# Patient Record
Sex: MALE | Race: BLACK OR AFRICAN AMERICAN
[De-identification: names, ages, dates, MRNs, and addresses within clinical notes are randomized per-mention and may not be internally consistent; named-entity substitution may affect disease eponyms.]

---

## 2017-04-10 ENCOUNTER — HOSPITAL ENCOUNTER (EMERGENCY)
Dept: HOSPITAL 62 - ER | Age: 77
Discharge: HOME | End: 2017-04-10
Payer: OTHER GOVERNMENT

## 2017-04-10 VITALS — SYSTOLIC BLOOD PRESSURE: 149 MMHG | DIASTOLIC BLOOD PRESSURE: 60 MMHG

## 2017-04-10 DIAGNOSIS — M10.9: Primary | ICD-10-CM

## 2017-04-10 DIAGNOSIS — M25.562: ICD-10-CM

## 2017-04-10 LAB
ANION GAP SERPL CALC-SCNC: 18 MMOL/L (ref 5–19)
BUN SERPL-MCNC: 81 MG/DL (ref 7–20)
CALCIUM: 10.2 MG/DL (ref 8.4–10.2)
CHLORIDE SERPL-SCNC: 95 MMOL/L (ref 98–107)
CO2 SERPL-SCNC: 28 MMOL/L (ref 22–30)
CREAT SERPL-MCNC: 2.95 MG/DL (ref 0.52–1.25)
GLUCOSE SERPL-MCNC: 109 MG/DL (ref 75–110)
MAGNESIUM SERPL-MCNC: 2.2 MG/DL (ref 1.6–2.3)
POTASSIUM SERPL-SCNC: 4.2 MMOL/L (ref 3.6–5)
SODIUM SERPL-SCNC: 140.9 MMOL/L (ref 137–145)

## 2017-04-10 PROCEDURE — 99283 EMERGENCY DEPT VISIT LOW MDM: CPT

## 2017-04-10 PROCEDURE — 36415 COLL VENOUS BLD VENIPUNCTURE: CPT

## 2017-04-10 PROCEDURE — 83735 ASSAY OF MAGNESIUM: CPT

## 2017-04-10 PROCEDURE — 80048 BASIC METABOLIC PNL TOTAL CA: CPT

## 2017-04-10 NOTE — ER DOCUMENT REPORT
ED Extremity Problem, Lower





- General


Chief Complaint: L knee pain


Stated Complaint: KNEE PAIN


Time seen by provider: 18:08


Mode of Arrival: Wheelchair


Information source: Patient


Notes: 


76-year-old male presents to ED for complain of left knee pain.  [facility] and [facility] that he had [facility]out he has a history of gout.  He states he does 

not know of any injury to this knee and these been on prednisone 10 mg for 3 

days.  He cannot take nonsteroidal anti-inflammatories due to a kidney failure.


TRAVEL OUTSIDE OF THE [place] IN LAST 30 DAYS: No





- HPI


Patient complains to provider of: Pain, Swelling, Other - Spasms to arms and 

legs he said for about a week or sleeping when he tries to sleep


Location: Knee


Occurred: Last week


Onset/Duration: Intermittent


Quality of pain: Sharp, Throbbing


Severity: Severe


Pain Level: 5


Context: Other - Gout to the knee


Recent injury: No


Associated symptoms: Painful ambulation


Exacerbated by: Movement, Walking


Relieved by: Nothing





- Related Data


Allergies/Adverse Reactions: 


 





shellfish derived Allergy (Verified 04/10/17 17:31)


 











Past Medical History





- General


Information source: Patient





- Social History


Smoking Status: Former Smoker


Cigarette use (# per day): No


Chew tobacco use (# tins/day): No


Smoking Education Provided: No


Frequency of alcohol use: None


Drug Abuse: None


Occupation: disabled


Lives with: Family - daughter


Family History: Reviewed & Not Pertinent


Patient has suicidal ideation: No


Patient has homicidal ideation: No





- Past Medical History


Cardiac Medical History: Reports: Hx DVT, Hx Hypertension, Hx Pulmonary Embolism


Pulmonary Medical History: Reports: None


EENT Medical History: Reports: None


Neurological Medical History: Reports: None


Endocrine Medical History: Reports: None


Renal/ Medical History: Reports: Hx Benign Prostatic Hyperplasia, Hx End 

Stage Renal Disease - myglobinuria, rhabdomyolysis, Other - Has been on 

hemodialysis in the past but is not on hemodialysis at this time.  His kidneys 

are starting to fail again.


Malignancy Medical History: Reports None


GI Medical History: Reports: Hx Diverticulitis, Hx Gastritis, Hx Colonoscopy, 

Hx Endoscopy


Musculoskeltal Medical History: Reports Hx Arthritis, Reports Hx Gout, Reports 

Hx Musculoskeletal Deformity, Reports Hx Musculoskeletal Trauma


Skin Medical History: Reports None


Psychiatric Medical History: Reports: None


Traumatic Medical History: Reports: None


Infectious Medical History: Reports: None


Past Surgical History: Reports: Hx Bowel Surgery, Hx Cholecystectomy, Hx 

Colostomy - And revision, Hx Pacemaker, Hx Tonsillectomy, Other - Dialysis 

shunt and revision





- Immunizations


Immunizations up to date: Yes





Review of Systems





- Review of Systems


Constitutional: No symptoms reported


EENT: No symptoms reported


Cardiovascular: No symptoms reported


Respiratory: No symptoms reported


Gastrointestinal: No symptoms reported


Genitourinary: No symptoms reported


Male Genitourinary: No symptoms reported


Musculoskeletal: Gout - Left knee, Muscle pain - Muscle spasms to arms and legs 

at night


Skin: No symptoms reported


Hematologic/Lymphatic: No symptoms reported


Neurological/Psychological: No symptoms reported


-: Yes All other systems reviewed and negative





Physical Exam





- Vital signs


Vitals: 


 











Temp Pulse Resp BP Pulse Ox


 


 99.2 F   69   20   148/69 H  99 


 


 04/10/17 17:27  04/10/17 17:27  04/10/17 17:27  04/10/17 17:27  04/10/17 17:27











Interpretation: Normal





- General


General appearance: Appears well, Alert





- HEENT


Head: Normocephalic, Atraumatic


Eyes: Normal


Pupils: PERRL





- Respiratory


Respiratory status: No respiratory distress


Chest status: Nontender


Breath sounds: Normal


Chest palpation: Normal





- Cardiovascular


Rhythm: Regular


Heart sounds: Normal auscultation


Murmur: No





- Abdominal


Inspection: Normal


Distension: No distension


Bowel sounds: Normal


Tenderness: Nontender


Organomegaly: No organomegaly





- Back


Back: Normal, Nontender





- Extremities


General upper extremity: Normal inspection, Nontender, Normal color, Normal ROM

, Normal temperature


General lower extremity: Normal color, Normal temperature.  No: Deena's sign


Knee: Tender, Pain with ROM, Unable to bear weight, Other - Swelling to the 

left knee redness warm due to gout





- Neurological


Neuro grossly intact: Yes


Cognition: Normal


Orientation: AAOx4


Vasiliy Coma Scale Eye Opening: Spontaneous


Vasiliy Coma Scale Verbal: Oriented


[place] Coma Scale Motor: Obeys Commands


Vasiliy Coma Scale Total: 15


Speech: Normal


Motor strength normal: LUE, RUE, LLE, RLE


Sensory: Normal





- Psychological


Associated symptoms: Normal affect, Normal mood





- Skin


Skin Temperature: Warm


Skin Moisture: Dry


Skin Color: Normal





Course





- Re-evaluation


Re-evalutation: 





04/10/17 19:32


Labs discussed with patient and daughter.  A written report of labs given to 

patient [facility].  Patient treated with 60 mg of 

prednisone and a prescription for 60 mg grams of prednisone for 5 days given to 

patient.  [facility] instructed to [facility] tomorrow to schedule follow-up 

with orthopedic if needed continues.





- Vital Signs


Vital signs: 


 











Temp Pulse Resp BP Pulse Ox


 


 99.0 F   70   18   149/60 H  98 


 


 04/10/17 19:45  04/10/17 19:45  04/10/17 19:45  04/10/17 19:45  04/10/17 19:45














- Laboratory


Result Diagrams: 


 04/10/17 18:24


Laboratory results interpreted by me: 


 











  04/10/17





  18:24


 


Chloride  95 L


 


BUN  81 H


 


Creatinine  2.95 H


 


Est GFR ( Amer)  25 L


 


Est GFR (Non-Af Amer)  21 L














Discharge





- Discharge


Clinical Impression: 


Gout attack


Qualifiers:


 Gout site: knee Gout etiology: unspecified cause Laterality: left Qualified 

Code(s): M10.9 - Gout, unspecified





Condition: Stable


Disposition: HOME, SELF-CARE


Additional Instructions: 


Gout





     You have been diagnosed as having gout.  Gout is a problem caused by an 

excess of uric acid, a natural chemical found in the body.  The cause of this 

disease is unknown.


     Gout arthritis occurs when crystals of uric acid form in the joints.  The 

big toe is the most common joint involved, but any joint can become affected.


     Persons with gout may also form uric acid kidney stones, resulting in 

flank pain and blood in the urine.  Nodules of uric acid may form under the 

skin.


     The first step of treatment is to decrease the inflammation in the joint 

with antiinflammatory medication.  Medication to lower the uric acid level in 

the blood may then be prescribed.  This medication should be taken regularly, 

as any sudden change in dosage may provoke an attack of gout.  Some foods, such 

as red meat, can provoke an attack in some gout sufferers.


     Call the doctor if new symptoms arise, or if you do not improve.


Gout Diet





     Changing your diet can decrease the uric acid in your blood. High levels 

of uric acid cause gouty arthritis and uric acid kidney stones. If you have gout

, you should avoid meats that are high in purine. Meat products to avoid 

include liver, kidneys, and brains. In general, poultry is better than red 

meats. Seafoods to avoid include anchovies, sardines, herring, mackerel, and 

scallops.


     In addition to limiting purine-rich foods, people with gout should limit 

protein intake to 10-15% of total calories. Carbohydrate intake should be 

around 50% of total daily calories. Limit fat intake to 30% of total daily 

calories. Cholesterol intake should be less than 300 mg/day.


     Maintain or achieve a healthy body weight. Weight loss should be gradual. 

Rapid weight loss can actually increase uric acid levels temporarily. Alcohol, 

especially beer, should be avoided.


     Get plenty of fluids. This dilutes urinary uric acid, and helps prevent 

uric acid kidney stones. Drink eight to twelve cups of water daily.





STEROID MEDICATION:








     You have been given a medicine of the cortisone/steroid class.  This 

medication is used to control inflammation or allergy.  It is usually only 

given for a short period of time, until the acute process subsides.


     There are usually no side effects from short-term use of cortisone-like 

medications.  Some persons feel an increased sense of well-being and are not 

sleepy at bedtime.  Long-term use of cortisone medications is best avoided, 

unless required for a severe condition.  If your condition does not remit, or 

relapses after the course of corticosteroid medication, you should consult your 

physician.





FOLLOW-UP CARE:


If you have been referred to a physician for follow-up care, call the physician

s office for an appointment as you were instructed or within the next two days.

  If you experience worsening or a significant change in your symptoms, notify 

the physician immediately or return to the Emergency Department at any time for 

re-evaluation.


Prescriptions: 


Prednisone [Deltasone 20 mg Tablet] 3 tab PO DAILY 5 Days


Forms:  Elevated Blood Pressure

## 2017-05-14 ENCOUNTER — HOSPITAL ENCOUNTER (INPATIENT)
Dept: HOSPITAL 62 - ER | Age: 77
LOS: 11 days | Discharge: NURSING, REHAB OR LONG-TERM CARE | DRG: 602 | End: 2017-05-25
Attending: FAMILY MEDICINE | Admitting: FAMILY MEDICINE
Payer: OTHER GOVERNMENT

## 2017-05-14 DIAGNOSIS — Z95.0: ICD-10-CM

## 2017-05-14 DIAGNOSIS — L03.116: Primary | ICD-10-CM

## 2017-05-14 DIAGNOSIS — N18.4: ICD-10-CM

## 2017-05-14 DIAGNOSIS — E87.1: ICD-10-CM

## 2017-05-14 DIAGNOSIS — B96.5: ICD-10-CM

## 2017-05-14 DIAGNOSIS — J18.9: ICD-10-CM

## 2017-05-14 DIAGNOSIS — Z86.711: ICD-10-CM

## 2017-05-14 DIAGNOSIS — Z60.2: ICD-10-CM

## 2017-05-14 DIAGNOSIS — B96.20: ICD-10-CM

## 2017-05-14 DIAGNOSIS — Z86.718: ICD-10-CM

## 2017-05-14 DIAGNOSIS — N39.0: ICD-10-CM

## 2017-05-14 DIAGNOSIS — J44.9: ICD-10-CM

## 2017-05-14 DIAGNOSIS — N40.0: ICD-10-CM

## 2017-05-14 DIAGNOSIS — G47.33: ICD-10-CM

## 2017-05-14 DIAGNOSIS — R26.89: ICD-10-CM

## 2017-05-14 DIAGNOSIS — Z87.891: ICD-10-CM

## 2017-05-14 DIAGNOSIS — I13.0: ICD-10-CM

## 2017-05-14 DIAGNOSIS — I50.22: ICD-10-CM

## 2017-05-14 DIAGNOSIS — D63.1: ICD-10-CM

## 2017-05-14 LAB
ALBUMIN SERPL-MCNC: 3.4 G/DL (ref 3.5–5)
ALP SERPL-CCNC: 53 U/L (ref 38–126)
ALT SERPL-CCNC: 47 U/L (ref 21–72)
ANION GAP SERPL CALC-SCNC: 10 MMOL/L (ref 5–19)
APPEARANCE UR: CLEAR
AST SERPL-CCNC: 22 U/L (ref 17–59)
BASOPHILS # BLD AUTO: 0.1 10^3/UL (ref 0–0.2)
BASOPHILS NFR BLD AUTO: 0.4 % (ref 0–2)
BILIRUB DIRECT SERPL-MCNC: 0.5 MG/DL (ref 0–0.4)
BILIRUB SERPL-MCNC: 2.2 MG/DL (ref 0.2–1.3)
BILIRUB UR QL STRIP: NEGATIVE
BUN SERPL-MCNC: 53 MG/DL (ref 7–20)
CALCIUM: 8.6 MG/DL (ref 8.4–10.2)
CHLORIDE SERPL-SCNC: 98 MMOL/L (ref 98–107)
CO2 SERPL-SCNC: 30 MMOL/L (ref 22–30)
CREAT SERPL-MCNC: 2.68 MG/DL (ref 0.52–1.25)
EOSINOPHIL # BLD AUTO: 0 10^3/UL (ref 0–0.6)
EOSINOPHIL NFR BLD AUTO: 0.4 % (ref 0–6)
ERYTHROCYTE [DISTWIDTH] IN BLOOD BY AUTOMATED COUNT: 20.9 % (ref 11.5–14)
GLUCOSE SERPL-MCNC: 142 MG/DL (ref 75–110)
GLUCOSE UR STRIP-MCNC: NEGATIVE MG/DL
HCT VFR BLD CALC: 34.9 % (ref 37.9–51)
HGB BLD-MCNC: 11.1 G/DL (ref 13.5–17)
HGB HCT DIFFERENCE: -1.6
KETONES UR STRIP-MCNC: NEGATIVE MG/DL
LYMPHOCYTES # BLD AUTO: 0.9 10^3/UL (ref 0.5–4.7)
LYMPHOCYTES NFR BLD AUTO: 8.2 % (ref 13–45)
MCH RBC QN AUTO: 26.7 PG (ref 27–33.4)
MCHC RBC AUTO-ENTMCNC: 31.9 G/DL (ref 32–36)
MCV RBC AUTO: 84 FL (ref 80–97)
MONOCYTES # BLD AUTO: 0.7 10^3/UL (ref 0.1–1.4)
MONOCYTES NFR BLD AUTO: 5.8 % (ref 3–13)
NEUTROPHILS # BLD AUTO: 9.8 10^3/UL (ref 1.7–8.2)
NEUTS SEG NFR BLD AUTO: 85.2 % (ref 42–78)
NITRITE UR QL STRIP: NEGATIVE
PH UR STRIP: 7 [PH] (ref 5–9)
POTASSIUM SERPL-SCNC: 4.2 MMOL/L (ref 3.6–5)
PROT SERPL-MCNC: 6.1 G/DL (ref 6.3–8.2)
PROT UR STRIP-MCNC: 30 MG/DL
PROTHROMBIN TIME: 14.4 SEC (ref 11.4–15.4)
RBC # BLD AUTO: 4.17 10^6/UL (ref 4.35–5.55)
SODIUM SERPL-SCNC: 138.4 MMOL/L (ref 137–145)
SP GR UR STRIP: 1.01
UROBILINOGEN UR-MCNC: NEGATIVE MG/DL (ref ?–2)
WBC # BLD AUTO: 11.6 10^3/UL (ref 4–10.5)

## 2017-05-14 PROCEDURE — 84100 ASSAY OF PHOSPHORUS: CPT

## 2017-05-14 PROCEDURE — 85610 PROTHROMBIN TIME: CPT

## 2017-05-14 PROCEDURE — 84443 ASSAY THYROID STIM HORMONE: CPT

## 2017-05-14 PROCEDURE — 83880 ASSAY OF NATRIURETIC PEPTIDE: CPT

## 2017-05-14 PROCEDURE — 87186 SC STD MICRODIL/AGAR DIL: CPT

## 2017-05-14 PROCEDURE — 87086 URINE CULTURE/COLONY COUNT: CPT

## 2017-05-14 PROCEDURE — 83036 HEMOGLOBIN GLYCOSYLATED A1C: CPT

## 2017-05-14 PROCEDURE — 80053 COMPREHEN METABOLIC PANEL: CPT

## 2017-05-14 PROCEDURE — 83735 ASSAY OF MAGNESIUM: CPT

## 2017-05-14 PROCEDURE — 76881 US COMPL JOINT R-T W/IMG: CPT

## 2017-05-14 PROCEDURE — 84481 FREE ASSAY (FT-3): CPT

## 2017-05-14 PROCEDURE — 71010: CPT

## 2017-05-14 PROCEDURE — 94660 CPAP INITIATION&MGMT: CPT

## 2017-05-14 PROCEDURE — 99285 EMERGENCY DEPT VISIT HI MDM: CPT

## 2017-05-14 PROCEDURE — 76770 US EXAM ABDO BACK WALL COMP: CPT

## 2017-05-14 PROCEDURE — 93010 ELECTROCARDIOGRAM REPORT: CPT

## 2017-05-14 PROCEDURE — 76882 US LMTD JT/FCL EVL NVASC XTR: CPT

## 2017-05-14 PROCEDURE — 87040 BLOOD CULTURE FOR BACTERIA: CPT

## 2017-05-14 PROCEDURE — 93306 TTE W/DOPPLER COMPLETE: CPT

## 2017-05-14 PROCEDURE — 80048 BASIC METABOLIC PNL TOTAL CA: CPT

## 2017-05-14 PROCEDURE — 83605 ASSAY OF LACTIC ACID: CPT

## 2017-05-14 PROCEDURE — 87088 URINE BACTERIA CULTURE: CPT

## 2017-05-14 PROCEDURE — 84439 ASSAY OF FREE THYROXINE: CPT

## 2017-05-14 PROCEDURE — 83970 ASSAY OF PARATHORMONE: CPT

## 2017-05-14 PROCEDURE — 81001 URINALYSIS AUTO W/SCOPE: CPT

## 2017-05-14 PROCEDURE — 93005 ELECTROCARDIOGRAM TRACING: CPT

## 2017-05-14 PROCEDURE — 85025 COMPLETE CBC W/AUTO DIFF WBC: CPT

## 2017-05-14 PROCEDURE — 36415 COLL VENOUS BLD VENIPUNCTURE: CPT

## 2017-05-14 RX ADMIN — METOPROLOL SUCCINATE SCH MG: 25 TABLET, EXTENDED RELEASE ORAL at 22:58

## 2017-05-14 RX ADMIN — DOXYCYCLINE HYCLATE SCH MG: 100 TABLET ORAL at 22:58

## 2017-05-14 RX ADMIN — HEPARIN SODIUM SCH UNIT: 5000 INJECTION, SOLUTION INTRAVENOUS; SUBCUTANEOUS at 23:36

## 2017-05-14 RX ADMIN — Medication SCH ML: at 23:38

## 2017-05-14 SDOH — SOCIAL STABILITY - SOCIAL INSECURITY: PROBLEMS RELATED TO LIVING ALONE: Z60.2

## 2017-05-14 NOTE — EKG REPORT
SEVERITY:- ABNORMAL ECG -

AFIB/FLUTTER AND VENTRICULAR-PACED RHYTHM

:

Confirmed by: Skye Plascencia 14-May-2017 22:43:08

## 2017-05-14 NOTE — ER DOCUMENT REPORT
ED Extremity Problem, Lower





<BENI GARZA - Last Filed: 05/14/17 16:20>





- General


Mode of Arrival: Medic


Information source: Patient


TRAVEL OUTSIDE OF THE [place] IN LAST 30 DAYS: No





<PRATIK WILEY - Last Filed: 05/14/17 17:58>





- General


Chief Complaint: Leg Pain


Stated Complaint: L LEG PAIN


Time Seen by Provider: 05/14/17 13:01


Notes: 


This is a 76-year-old male with a history of CHF, hypertension who presents 

with lower extremity swelling which has been worse for the past few days.  He 

states that his left leg is more painful.  He takes Lasix once a day and has 

been told to increase this to twice a day as needed but he has not done so.  He 

denies any fevers but has had some chills.  No cough or congestion. (PRATIK WILEY)





- Related Data


Allergies/Adverse Reactions: 


 





shellfish derived Allergy (Verified 04/10/17 17:31)


 











Past Medical History





- General


Information source: Patient, [facility]





- Social History


Smoking Status: Former Smoker


Chew tobacco use (# tins/day): No


Frequency of alcohol use: None


Drug Abuse: None


Family History: Reviewed & Not Pertinent





- Past Medical History


Cardiac Medical History: Reports: Hx Congestive Heart Failure, Hx DVT, Hx 

Hypertension, Hx Pulmonary Embolism


Pulmonary Medical History: Reports: Hx COPD


Renal/ Medical History: Reports: Hx Benign Prostatic Hyperplasia, Hx End 

Stage Renal Disease - myglobinuria, rhabdomyolysis.  Denies: Hx Peritoneal 

Dialysis


GI Medical History: Reports: Hx Diverticulitis, Hx Gastritis, Hx Colonoscopy, 

Hx Endoscopy


Musculoskeltal Medical History: Reports Hx Arthritis, Reports Hx Gout, Reports 

Hx Musculoskeletal Deformity, Reports Hx Musculoskeletal Trauma


Past Surgical History: Reports: Hx Bowel Surgery, Hx Cholecystectomy, Hx 

Colostomy - And revision, Hx Pacemaker, Hx Tonsillectomy, Other - Dialysis 

shunt and revision





- Immunizations


Immunizations up to date: Yes


Hx Diphtheria, Pertussis, Tetanus Vaccination: No





<PRATIK WILEY - Last Filed: 05/14/17 17:58>





Review of Systems





<BENI GARZA - Last Filed: 05/14/17 16:20>





<PRATIK WILEY - Last Filed: 05/14/17 17:58>





- Review of Systems


Notes: 


REVIEW OF SYSTEMS:


CONSTITUTIONAL :  Denies fever.   Denies recent illness.


EENT:   Denies eye, ear, throat, or mouth pain or symptoms.  Denies nasal or 

sinus congestion.


CARDIOVASCULAR:  Denies chest pain.


RESPIRATORY:  Denies cough, cold, or chest congestion.  Denies shortness of 

breath, difficulty breathing, or wheezing.


GASTROINTESTINAL:  Denies abdominal pain.  Denies nausea, vomiting, or 

diarrhea.  


GENITOURINARY:  Denies difficulty urinating, painful urination, burning, 

frequency, or blood in urine.


MUSCULOSKELETAL: As per history of present illness


SKIN: Redness to left lower extremity


HEMATOLOGIC :   Denies easy bruising or bleeding.


LYMPHATIC:  Denies swollen, enlarged glands.


NEUROLOGICAL:  Denies altered mental status or loss of consciousness.  Denies 

headache. 


PSYCHIATRIC:  Denies anxiety or stress or depression.


ALL OTHER SYSTEMS REVIEWED AND NEGATIVE. (PRATIK WILEY)





Physical Exam





<BENI GARZA - Last Filed: 05/14/17 16:20>





<PRATIK WILEY - Last Filed: 05/14/17 17:58>





- Vital signs


Vitals: 


 











Resp


 


 16 


 


 05/14/17 12:30














- Notes


Notes: 


PHYSICAL EXAMINATION:





GENERAL: Well-appearing, well-nourished and in no acute distress.  Pleasant and 

conversant





HEAD: Atraumatic, normocephalic.





EYES: Pupils equal round and reactive to light, extraocular movements intact, 

sclera anicteric, conjunctiva are normal.





ENT: nares patent, oropharynx clear without exudates.  Moist mucous membranes.





NECK: Normal range of motion, supple without lymphadenopathy





LUNGS: decreased bibasilar breath sounds, no wheezes, no rhonchi, no rales





HEART: Regular rate and rhythm without murmurs





ABDOMEN: Soft, obese, nontender, normoactive bowel sounds.  No guarding, no 

rebound.  No masses appreciated.





EXTREMITIES: 2+ pitting edema BLE, symmetric.  LLE with erythema, warmth, and 

several small anterior areas of skin breakdown with weeping to anterior shin.  

Compartments soft, not tense.  Distal sensation intact.  ROM intact.





NEUROLOGICAL: Cranial nerves grossly intact.  No gross focal motor or sensory 

deficits appreciated





PSYCH: Normal mood, normal affect.


 (PRATIK WILEY)





Course





- Laboratory


Result Diagrams: 


 05/14/17 15:33





 05/14/17 15:33





<BENI GARZA - Last Filed: 05/14/17 16:20>





- Laboratory


Result Diagrams: 


 05/14/17 15:33





 05/14/17 15:33





<PRATIK IWLEY - Last Filed: 05/14/17 17:58>





- Re-evaluation


Re-evalutation: 


05/14/17 16:08


Concern for LLE cellulitis, and possible pneumonia on CXR, IV abx initiated





05/14/17 17:57


Labs reviewed.  Pt re-examined.  LLE very warm to touch.  Compartments remain 

soft.  Discussed with hospitalist Dr Lopes for admission.  Discussed plan with 

patient, questions answered. (PRATIK WILEY)





- Vital Signs


Vital signs: 


 











Temp Pulse Resp BP Pulse Ox


 


 98.0 F      21 H  153/73 H  98 


 


 05/14/17 17:40     05/14/17 17:49  05/14/17 17:49  05/14/17 17:49














- Laboratory


Laboratory results interpreted by me: 


 











  05/14/17 05/14/17 05/14/17





  15:33 15:33 17:10


 


WBC  11.6 H  


 


RBC  4.17 L  


 


Hgb  11.1 L  


 


Hct  34.9 L  


 


MCH  26.7 L  


 


MCHC  31.9 L  


 


RDW  20.9 H  


 


Plt Count  144 L  


 


Seg Neutrophils %  85.2 H  


 


Lymphocytes %  8.2 L  


 


Absolute Neutrophils  9.8 H  


 


BUN   53 H 


 


Creatinine   2.68 H 


 


Est GFR ( Amer)   28 L 


 


Est GFR (Non-Af Amer)   23 L 


 


Glucose   142 H 


 


Total Bilirubin   2.2 H 


 


Direct Bilirubin   0.5 H 


 


Total Protein   6.1 L 


 


Albumin   3.4 L 


 


Urine Protein    30 H














Procedures





- Additional Procedures


  ** IV insertion


Time performed: 15:00


Additional Procedures: IV insertion





<BENI GARZA - Last Filed: 05/14/17 16:20>





<PRATIK WILEY - Last Filed: 05/14/17 17:58>





- Additional Procedures


  ** IV insertion


Notes: 


20 guage IV inserted into right brachial vein under ultrasound guidance due to 

difficulty obtaining access by RN. (BENI GARZA)





Discharge





<BENI GARZA - Last Filed: 05/14/17 16:20>





- Discharge


Admitting Provider: Hospitalist - Dr. Lopes


Unit Admitted: Telemetry





<PRATIK WILEY - Last Filed: 05/14/17 17:58>





- Discharge


Clinical Impression: 


 Left leg cellulitis





Chronic kidney disease (CKD)


Qualifiers:


 Chronic kidney disease stage: unspecified stage Qualified Code(s): N18.9 - 

Chronic kidney disease, unspecified

## 2017-05-14 NOTE — PDOC H&P
History of Present Illness


Admission Date/PCP: 


Dr. Calvin


Patient complains of: Left leg pain


History of Present Illness: 


DIANA HUNT is a 76 year old male with past medical history chronic kidney 

disease, hypertension, BPH, pacemaker presents to the hospital with 3 days 

worsening left lower extremity pain and swelling.  Patient has somewhat chronic 

edema in both lower extremities.








Past Medical History


Cardiac Medical History: Reports: Congestive Heart Failure, DVT, Hypertension, 

Pulmonary Embolism


Pulmonary Medical History: Reports: Chronic Obstructive Pulmonary Disease (COPD)


Renal/ Medical History: Reports: Chronic Kidney Disease


GI Medical History: Reports: Diverticulitis


Musculoskeltal Medical History: Reports: Arthritis, Gout





Past Surgical History


Past Surgical History: Reports: Cholecystectomy, Colostomy - And revision, 

Pacemaker, Tonsillectomy, Other - Dialysis shunt and revision





Social History


Information Source: Patient


Smoking Status: Former Smoker


Frequency of Alcohol Use: None


Hx Recreational Drug Use: No


Hx Prescription Drug Abuse: No





- Advance Directive


Resuscitation Status: Full Code





Family History


Family History: Reviewed & Not Pertinent


Parental Family History Reviewed: Yes


Children Family History Reviewed: Yes


Sibling(s) Family History Reviewed.: Yes





Medication/Allergy


Home Medications: 








Prednisone [Deltasone 20 mg Tablet] 3 tab PO DAILY 5 Days 04/10/17 








Allergies/Adverse Reactions: 


 





shellfish derived Allergy (Verified 04/10/17 17:31)


 











Review of Systems


Constitutional: ABSENT: chills, fever(s), headache(s), weight gain, weight loss


Eyes: ABSENT: visual disturbances


Ears: ABSENT: hearing changes


Cardiovascular: PRESENT: edema.  ABSENT: chest pain, dyspnea on exertion, 

orthropnea, palpitations


Respiratory: ABSENT: cough, hemoptysis


Gastrointestinal: ABSENT: abdominal pain, constipation, diarrhea, hematemesis, 

hematochezia, nausea, vomiting


Genitourinary: ABSENT: dysuria, hematuria


Musculoskeletal: ABSENT: joint swelling


Integumentary: PRESENT: erythema - Left lower extremity.  ABSENT: rash, wounds


Neurological: ABSENT: abnormal gait, abnormal speech, confusion, dizziness, 

focal weakness, syncope


Psychiatric: ABSENT: anxiety, depression, homidical ideation, suicidal ideation


Endocrine: ABSENT: cold intolerance, heat intolerance, polydipsia, polyuria


Hematologic/Lymphatic: ABSENT: easy bleeding, easy bruising





Physical Exam


Vital Signs: 


 











Temp Pulse Resp BP Pulse Ox


 


 98.0 F      21 H  153/73 H  98 


 


 05/14/17 17:40     05/14/17 17:49  05/14/17 17:49  05/14/17 17:49








PHYSICAL EXAM:





GENERAL: Appears well, no acute distress            


HEENT: Normocephalic, no scleral icterus, conjunctiva clear, EOEM intact, PERRLA

, moist mucous membranes            


NECK: trachea midline, no thyromegally            


RESPIRATORY: Clear to auscultation, no wheezes/rhonchi


CARDIAC: Regular rate and rhythm, no murmur/chantell/rub         


ABDOMEN: Soft, no distension, no tenderness, no guarding, normal bowel sounds, 

negative Diez sign         


RECTAL: deferred


: deferred


EXTREMITIES: 3+ Pitting edema and bilateral lower extremities


MUSCULOSKELETAL: No joint swelling or deformity


VASCULAR: normal peripheral pulses


NEUROLOGIC: Alert, oriented to person/place/time, normal speech, cranial nerves 

grossly intact, 5/5 strength in all extremities, tactile sensation intact in 

all extremities.  No pain with active/passive range of motion in left lower 

extremity


SKIN: Erythema/induration left lower extremity distal to the knee


PSYCHIATRIC: Normal mood, normal affect





Results


Laboratory Results: 


 





 05/14/17 15:33 





 05/14/17 15:33 





 











  05/14/17 05/14/17 05/14/17





  15:33 15:33 15:33


 


WBC  11.6 H  


 


RBC  4.17 L  


 


Hgb  11.1 L  


 


Hct  34.9 L  


 


MCV  84  


 


MCH  26.7 L  


 


MCHC  31.9 L  


 


RDW  20.9 H  


 


Plt Count  144 L  


 


Seg Neutrophils %  85.2 H  


 


Lymphocytes %  8.2 L  


 


Monocytes %  5.8  


 


Eosinophils %  0.4  


 


Basophils %  0.4  


 


Absolute Neutrophils  9.8 H  


 


Absolute Lymphocytes  0.9  


 


Absolute Monocytes  0.7  


 


Absolute Eosinophils  0.0  


 


Absolute Basophils  0.1  


 


Sodium   138.4 


 


Potassium   4.2 


 


Chloride   98 


 


Carbon Dioxide   30 


 


Anion Gap   10 


 


BUN   53 H 


 


Creatinine   2.68 H 


 


Est GFR ( Amer)   28 L 


 


Est GFR (Non-Af Amer)   23 L 


 


Glucose   142 H 


 


Lactic Acid    2.0


 


Calcium   8.6 


 


Total Bilirubin   2.2 H 


 


AST   22 


 


ALT   47 


 


Alkaline Phosphatase   53 


 


Total Protein   6.1 L 


 


Albumin   3.4 L 


 


Urine Color   


 


Urine Appearance   


 


Urine pH   


 


Ur Specific Gravity   


 


Urine Protein   


 


Urine Glucose (UA)   


 


Urine Ketones   


 


Urine Blood   


 


Urine Nitrite   


 


Ur Leukocyte Esterase   


 


Urine WBC (Auto)   


 


Urine RBC (Auto)   














  05/14/17





  17:10


 


WBC 


 


RBC 


 


Hgb 


 


Hct 


 


MCV 


 


MCH 


 


MCHC 


 


RDW 


 


Plt Count 


 


Seg Neutrophils % 


 


Lymphocytes % 


 


Monocytes % 


 


Eosinophils % 


 


Basophils % 


 


Absolute Neutrophils 


 


Absolute Lymphocytes 


 


Absolute Monocytes 


 


Absolute Eosinophils 


 


Absolute Basophils 


 


Sodium 


 


Potassium 


 


Chloride 


 


Carbon Dioxide 


 


Anion Gap 


 


BUN 


 


Creatinine 


 


Est GFR ( Amer) 


 


Est GFR (Non-Af Amer) 


 


Glucose 


 


Lactic Acid 


 


Calcium 


 


Total Bilirubin 


 


AST 


 


ALT 


 


Alkaline Phosphatase 


 


Total Protein 


 


Albumin 


 


Urine Color  YELLOW


 


Urine Appearance  CLEAR


 


Urine pH  7.0


 


Ur Specific [place]  1.011


 


Urine Protein  30 H


 


Urine Glucose (UA)  NEGATIVE


 


Urine Ketones  NEGATIVE


 


Urine Blood  NEGATIVE


 


Urine Nitrite  NEGATIVE


 


Ur Leukocyte Esterase  NEGATIVE


 


Urine WBC (Auto)  2


 


Urine RBC (Auto)  1











Impressions: 


 





Chest X-Ray  05/14/17 13:18


IMPRESSION:  Left retrocardiac airspace disease either atelectasis or pneumonia.


 














Assessment & Plan





- Diagnosis


(1) Left leg cellulitis


Is this a current diagnosis for this admission?: YesPlan: 


Patient will be admitted to the hospital.  Start IV clindamycin.  Check blood 

culture.








(2) Pneumonia





Is this a current diagnosis for this admission?: YesPlan: 


Retrocardiac airspace disease noted on chest x-ray.  Likely bacterial.  Start 

oral doxycycline.








(3) Edema





Is this a current diagnosis for this admission?: YesPlan: 


Start Lasix 40 mg IV every 12 hours.  Likely related to chronic kidney disease 

however I would like to check echocardiogram to rule out CHF.








(4) Chronic kidney disease (CKD)


Qualifiers: 


     Chronic kidney disease stage: unspecified stage     Qualified Code(s): 

N18.9 - Chronic kidney disease, unspecified  


Is this a current diagnosis for this admission?: YesPlan: 


Monitor kidney function closely with aggressive diuresis.








(5) Hypertension





Is this a current diagnosis for this admission?: YesPlan: 


When necessary IV hydralazine.  Start Toprol-XL 25 mg twice daily.








(6) History of pacemaker


Is this a current diagnosis for this admission?: Yes








- Time


Time Spent: Greater than 70 Minutes


Anticipated discharge: Home





- Inpatient Certification


Medical Necessity: Need for IV Antibiotics

## 2017-05-15 LAB
ANION GAP SERPL CALC-SCNC: 10 MMOL/L (ref 5–19)
BASOPHILS # BLD AUTO: 0.1 10^3/UL (ref 0–0.2)
BASOPHILS NFR BLD AUTO: 0.6 % (ref 0–2)
BUN SERPL-MCNC: 53 MG/DL (ref 7–20)
CALCIUM: 8 MG/DL (ref 8.4–10.2)
CHLORIDE SERPL-SCNC: 99 MMOL/L (ref 98–107)
CO2 SERPL-SCNC: 28 MMOL/L (ref 22–30)
CREAT SERPL-MCNC: 2.78 MG/DL (ref 0.52–1.25)
EOSINOPHIL # BLD AUTO: 0.1 10^3/UL (ref 0–0.6)
EOSINOPHIL NFR BLD AUTO: 0.5 % (ref 0–6)
ERYTHROCYTE [DISTWIDTH] IN BLOOD BY AUTOMATED COUNT: 21.1 % (ref 11.5–14)
GLUCOSE SERPL-MCNC: 115 MG/DL (ref 75–110)
HCT VFR BLD CALC: 32.3 % (ref 37.9–51)
HGB BLD-MCNC: 10.1 G/DL (ref 13.5–17)
HGB HCT DIFFERENCE: -2
LYMPHOCYTES # BLD AUTO: 1 10^3/UL (ref 0.5–4.7)
LYMPHOCYTES NFR BLD AUTO: 7.8 % (ref 13–45)
MAGNESIUM SERPL-MCNC: 1.8 MG/DL (ref 1.6–2.3)
MCH RBC QN AUTO: 26.6 PG (ref 27–33.4)
MCHC RBC AUTO-ENTMCNC: 31.3 G/DL (ref 32–36)
MCV RBC AUTO: 85 FL (ref 80–97)
MONOCYTES # BLD AUTO: 1 10^3/UL (ref 0.1–1.4)
MONOCYTES NFR BLD AUTO: 8 % (ref 3–13)
NEUTROPHILS # BLD AUTO: 10.2 10^3/UL (ref 1.7–8.2)
NEUTS SEG NFR BLD AUTO: 83.1 % (ref 42–78)
POTASSIUM SERPL-SCNC: 4.7 MMOL/L (ref 3.6–5)
RBC # BLD AUTO: 3.8 10^6/UL (ref 4.35–5.55)
SODIUM SERPL-SCNC: 136.5 MMOL/L (ref 137–145)
T3FREE SERPL-MCNC: 3.17 PG/ML (ref 2.77–5.27)
TSH SERPL-ACNC: 1.34 UIU/ML (ref 0.47–4.68)
WBC # BLD AUTO: 12.3 10^3/UL (ref 4–10.5)

## 2017-05-15 RX ADMIN — FUROSEMIDE SCH MG: 10 INJECTION, SOLUTION INTRAMUSCULAR; INTRAVENOUS at 17:50

## 2017-05-15 RX ADMIN — DOXYCYCLINE HYCLATE SCH MG: 100 TABLET ORAL at 12:17

## 2017-05-15 RX ADMIN — DOXYCYCLINE HYCLATE SCH MG: 100 TABLET ORAL at 22:20

## 2017-05-15 RX ADMIN — CLINDAMYCIN PHOSPHATE SCH ML: 12 INJECTION, SOLUTION INTRAVENOUS at 06:17

## 2017-05-15 RX ADMIN — CLINDAMYCIN PHOSPHATE SCH ML: 12 INJECTION, SOLUTION INTRAVENOUS at 22:20

## 2017-05-15 RX ADMIN — METOPROLOL SUCCINATE SCH MG: 25 TABLET, EXTENDED RELEASE ORAL at 22:20

## 2017-05-15 RX ADMIN — HEPARIN SODIUM SCH UNIT: 5000 INJECTION, SOLUTION INTRAVENOUS; SUBCUTANEOUS at 22:20

## 2017-05-15 RX ADMIN — HEPARIN SODIUM SCH UNIT: 5000 INJECTION, SOLUTION INTRAVENOUS; SUBCUTANEOUS at 06:17

## 2017-05-15 RX ADMIN — Medication SCH ML: at 17:50

## 2017-05-15 RX ADMIN — CLINDAMYCIN PHOSPHATE SCH ML: 12 INJECTION, SOLUTION INTRAVENOUS at 12:42

## 2017-05-15 RX ADMIN — METOPROLOL SUCCINATE SCH MG: 25 TABLET, EXTENDED RELEASE ORAL at 12:16

## 2017-05-15 RX ADMIN — Medication SCH ML: at 06:17

## 2017-05-15 RX ADMIN — HEPARIN SODIUM SCH UNIT: 5000 INJECTION, SOLUTION INTRAVENOUS; SUBCUTANEOUS at 12:44

## 2017-05-15 RX ADMIN — OXYCODONE HYDROCHLORIDE PRN MG: 5 TABLET ORAL at 12:40

## 2017-05-15 RX ADMIN — OXYCODONE HYDROCHLORIDE PRN MG: 5 TABLET ORAL at 06:21

## 2017-05-15 RX ADMIN — Medication SCH ML: at 22:20

## 2017-05-15 NOTE — XCELERA REPORT
[address] [address]

                             Tel: [phone]

                             Fax: [phone]



                    Transthoracic Echocardiogram Report

____________________________________________________________________________



Name: DIANA HUNT

MRN: [id number]                Age: 76 yrs

Gender: Male                   : 1940

Patient Status: Inpatient      Patient Location: 5\S\528\S\A

Account #: [id number]

Study Date: 05/15/2017 09:16 AM

Accession #: [id number]

____________________________________________________________________________



       Weight: 270 lb

____________________________________________________________________________



Procedure: A two-dimensional transthoracic echocardiogram with color flow

and Doppler was performed. The study was technically limited with all

images being suboptimal in quality.

Reason For Study: EDEMA



History: EDEMA.

Ordering Physician: TANYA CABEZAS

Performed By: Karishma Peck

____________________________________________________________________________





Interpretation Summary

The left ventricle is normal in size.

There is normal left ventricular wall thickness.

LV EF is > than 60%

Apical wall motion abnormality may reflect pacemaker activation

The right ventricle is grossly normal size.

There is a pacemaker lead in the right ventricle.

The left atrium is mildly dilated.

There is no evidence of mitral valve prolapse.

There is no mitral valve stenosis.

There is a mild to moderate amount of mitral regurgitation

The aortic valve is mildly calcified

There is no aortic valve stenosis

There is no LVOT obstruction.

No aortic regurgitation is present.

The inferior vena cava appeared dilated and decreased < 50% with

respiration (RAP 15-20 mmHg)

There is no tricuspid stenosis.

There is a trace to mild amount of tricuspid regurgitation

There is moderate pulmonary hypertension by echo

RVSP is 50 to 55 mm of Hg , with RA mean of 15 to20.

There is no pericardial effusion.

____________________________________________________________________________





MMode/2D Measurements \T\ Calculations

RVDd: 3.2 cm  LVIDd: 4.2 cm  FS: 36.1 %            Ao root diam: 3.1 cm

IVSd: 1.1 cm  LVIDs: 2.7 cm  EDV(Teich): 79.4 ml   Ao root area: 7.5 cm2

              LVPWd: 1.1 cm  ESV(Teich): 26.9 ml   LA dimension: 4.2 cm

                             EF(Teich): 66.1 %



Doppler Measurements \T\ Calculations

MV E max erin:      MV P1/2t max erin:     Ao V2 max:        LV V1 max P.8 cm/sec       131.3 cm/sec          140.5 cm/sec      3.8 mmHg

                   MV P1/2t: 42.9 msec   Ao max PG:        LV V1 max:

                   MVA(P1/2t): 5.1 cm2   7.9 mmHg          97.7 cm/sec

                   MV dec slope:



                   895.8 cm/sec2

                   MV dec time: 0.15 sec

        _____________________________________________________________



PA V2 max:         PI end-d erin:         TR max erin:

70.6 cm/sec        88.2 cm/sec           294.5 cm/sec

PA max P.0 mmHg                      TR max P.7 mmHg

____________________________________________________________________________

Left Ventricle

The left ventricle is normal in size. There is normal left ventricular wall

thickness. LV EF is > than 60%. Left ventricular systolic function is

normal. The left ventricular wall motion is normal. Apical wall motion

abnormality may reflect pacemaker activation.





Right Ventricle

The right ventricle is grossly normal size. There is a pacemaker lead in

the right ventricle.



Atria

Right atrium not well visualized secondary to technical limitations. The

left atrium is mildly dilated.



Mitral Valve

There is mild mitral annular calcification. There is no evidence of mitral

valve prolapse. There is no vegetation seen on the mitral valve. There is

no mitral valve stenosis. There is a mild to moderate amount of mitral

regurgitation.



Aortic Valve

The aortic valve is mildly calcified. There is no aortic valvular

vegetation. There is no aortic valve stenosis. There is no LVOT

obstruction. No aortic regurgitation is present.



Tricuspid Valve

There is no tricuspid stenosis. There is a trace to mild amount of

tricuspid regurgitation. There is moderate pulmonary hypertension by echo.

RVSP is 50 to 55 mm of Hg , with RA mean of 15 to20.





Pulmonic Valve

There is no pulmonic valvular stenosis. There is a trace amount of pulmonic

regurgitation.



Great Vessels

The aortic root is not well visualized but is probably normal size. The

inferior vena cava appeared dilated and decreased < 50% with respiration

(RAP 15-20 mmHg).



Effusions

There is no pericardial effusion.





____________________________________________________________________________

Electronically signed by:      Saritha Mendoza      on 05/15/2017 03:33

PM



CC: TANYA CABEZAS

>

Saritha Mendoza

## 2017-05-15 NOTE — PDOC PROGRESS REPORT
Subjective


Progress Note for:: 05/15/17


Subjective:: 


Patient has no particular complaints.  He feels as though his left leg may be 

slightly better yesterday.  He has no further chills. 





Physical Exam


Vital Signs: 


 











Temp Pulse Resp BP Pulse Ox


 


 99.1 F   70   16   116/64   98 


 


 05/15/17 11:29  05/15/17 11:29  05/15/17 11:29  05/15/17 11:29  05/15/17 11:29








 Intake & Output











 05/14/17 05/15/17 05/16/17





 06:59 06:59 06:59


 


Intake Total  305 


 


Output Total  400 


 


Balance  -95 


 


Weight   86.1 kg








GENERAL: No acute distress


HEENT: Conjunctiva clear, nonicteric, moist mucous membranes, no JVD, midline 

trachea


RESPIRATORY: Clear to auscultation bilaterally, no wheezes, no rhonchi


CARDIAC: Regular rate and rhythm, no murmurs/gallops/rubs


ABDOMEN: Soft, nondistended, nontender, positive bowel sounds, no rebound, no 

guarding


EXTREMETIES: No edema, cyanosis, clubbing


NEUROLOGIC: Alert, oriented to person/place/time, CN's grossly intact,  no 

focal deficits


SKIN: Erythema/induration left lower extremity improved


PSYCH: Normal mood, normal affect








Results


Laboratory Results: 


 





 05/15/17 06:01 





 05/15/17 06:01 





 











  05/15/17 05/15/17 05/15/17





  06:01 06:01 06:01


 


WBC  12.3 H  


 


RBC  3.80 L  


 


Hgb  10.1 L  


 


Hct  32.3 L  


 


MCV  85  


 


MCH  26.6 L  


 


MCHC  31.3 L  


 


RDW  21.1 H  


 


Plt Count  120 L  


 


Seg Neutrophils %  83.1 H  


 


Lymphocytes %  7.8 L  


 


Monocytes %  8.0  


 


Eosinophils %  0.5  


 


Basophils %  0.6  


 


Absolute Neutrophils  10.2 H  


 


Absolute Lymphocytes  1.0  


 


Absolute Monocytes  1.0  


 


Absolute Eosinophils  0.1  


 


Absolute Basophils  0.1  


 


Sodium   136.5 L 


 


Potassium   4.7 


 


Chloride   99 


 


Carbon Dioxide   28 


 


Anion Gap   10 


 


BUN   53 H 


 


Creatinine   2.78 H 


 


Est GFR ( Amer)   27 L 


 


Est GFR (Non-Af Amer)   22 L 


 


Glucose   115 H 


 


Calcium   8.0 L 


 


Magnesium   1.8 


 


TSH    1.34


 


Free T4    1.86


 


Free T3 pg/mL    3.17











Impressions: 


 





Chest X-Ray  05/14/17 13:18


IMPRESSION:  Left retrocardiac airspace disease either atelectasis or pneumonia.


 








Tibia/Fibula X-Ray  05/14/17 17:34


IMPRESSION:  NO SIGNIFICANT RADIOGRAPHIC ABNORMALITY.


 














Assessment & Plan





- Diagnosis


(1) Left leg cellulitis


Is this a current diagnosis for this admission?: YesPlan: 








Patient will be admitted to the hospital.  Continue IV clindamycin.  Check 

blood culture.





Unable to obtain MRI of the lower extremity secondary to pacemaker.  Unable to 

obtain CT scan with contrast secondary to chronic kidney disease and shellfish 

allergy.








(2) Pneumonia





Is this a current diagnosis for this admission?: YesPlan: 





Retrocardiac airspace disease noted on chest x-ray.  Likely bacterial.  

Continue oral doxycycline until 05/20/2017.








(3) Edema





Is this a current diagnosis for this admission?: YesPlan: 





Continue Lasix 40 mg IV every 12 hours.  Likely related to chronic kidney 

disease however I would like to check echocardiogram to rule out CHF.








(4) Chronic kidney disease (CKD)


Qualifiers: 


     Chronic kidney disease stage: unspecified stage     Qualified Code(s): 

N18.9 - Chronic kidney disease, unspecified  


Is this a current diagnosis for this admission?: YesPlan: 








Monitor kidney function closely with aggressive diuresis.  Patient will need to 

see nephrology as an outpatient.  I will consult nephrology inpatient kidney 

function worsens.  Check renal ultrasound.








(5) Hypertension





Is this a current diagnosis for this admission?: Yes





(6) History of pacemaker


Is this a current diagnosis for this admission?: Yes





(7) Obstructive sleep apnea


Is this a current diagnosis for this admission?: YesPlan: 


CPAP








(8) Urinary tract infection





Is this a current diagnosis for this admission?: YesPlan: 





Doxycycline pending further sensitivity.











- Time


Time Spent with patient: 35 or more minutes

## 2017-05-16 LAB
ANION GAP SERPL CALC-SCNC: 9 MMOL/L (ref 5–19)
BASOPHILS # BLD AUTO: 0 10^3/UL (ref 0–0.2)
BASOPHILS NFR BLD AUTO: 0.4 % (ref 0–2)
BUN SERPL-MCNC: 58 MG/DL (ref 7–20)
CALCIUM: 7.6 MG/DL (ref 8.4–10.2)
CHLORIDE SERPL-SCNC: 98 MMOL/L (ref 98–107)
CO2 SERPL-SCNC: 26 MMOL/L (ref 22–30)
CREAT SERPL-MCNC: 3.23 MG/DL (ref 0.52–1.25)
EOSINOPHIL # BLD AUTO: 0.1 10^3/UL (ref 0–0.6)
EOSINOPHIL NFR BLD AUTO: 0.7 % (ref 0–6)
ERYTHROCYTE [DISTWIDTH] IN BLOOD BY AUTOMATED COUNT: 20.4 % (ref 11.5–14)
GLUCOSE SERPL-MCNC: 104 MG/DL (ref 75–110)
HCT VFR BLD CALC: 31.7 % (ref 37.9–51)
HGB BLD-MCNC: 10.1 G/DL (ref 13.5–17)
HGB HCT DIFFERENCE: -1.4
LYMPHOCYTES # BLD AUTO: 1.1 10^3/UL (ref 0.5–4.7)
LYMPHOCYTES NFR BLD AUTO: 10.1 % (ref 13–45)
MCH RBC QN AUTO: 26.8 PG (ref 27–33.4)
MCHC RBC AUTO-ENTMCNC: 31.9 G/DL (ref 32–36)
MCV RBC AUTO: 84 FL (ref 80–97)
MONOCYTES # BLD AUTO: 0.9 10^3/UL (ref 0.1–1.4)
MONOCYTES NFR BLD AUTO: 8.4 % (ref 3–13)
NEUTROPHILS # BLD AUTO: 8.4 10^3/UL (ref 1.7–8.2)
NEUTS SEG NFR BLD AUTO: 80.4 % (ref 42–78)
POTASSIUM SERPL-SCNC: 4.6 MMOL/L (ref 3.6–5)
RBC # BLD AUTO: 3.78 10^6/UL (ref 4.35–5.55)
SODIUM SERPL-SCNC: 133.2 MMOL/L (ref 137–145)
WBC # BLD AUTO: 10.5 10^3/UL (ref 4–10.5)

## 2017-05-16 RX ADMIN — CLINDAMYCIN PHOSPHATE SCH ML: 12 INJECTION, SOLUTION INTRAVENOUS at 21:54

## 2017-05-16 RX ADMIN — FUROSEMIDE SCH MG: 10 INJECTION, SOLUTION INTRAMUSCULAR; INTRAVENOUS at 05:24

## 2017-05-16 RX ADMIN — OXYCODONE HYDROCHLORIDE PRN MG: 5 TABLET ORAL at 11:19

## 2017-05-16 RX ADMIN — CLINDAMYCIN PHOSPHATE SCH ML: 12 INJECTION, SOLUTION INTRAVENOUS at 05:24

## 2017-05-16 RX ADMIN — LANSOPRAZOLE SCH MG: 30 TABLET, ORALLY DISINTEGRATING, DELAYED RELEASE ORAL at 09:15

## 2017-05-16 RX ADMIN — CLINDAMYCIN PHOSPHATE SCH ML: 12 INJECTION, SOLUTION INTRAVENOUS at 15:16

## 2017-05-16 RX ADMIN — HEPARIN SODIUM SCH UNIT: 5000 INJECTION, SOLUTION INTRAVENOUS; SUBCUTANEOUS at 05:25

## 2017-05-16 RX ADMIN — Medication SCH ML: at 15:17

## 2017-05-16 RX ADMIN — METOPROLOL SUCCINATE SCH MG: 25 TABLET, EXTENDED RELEASE ORAL at 09:15

## 2017-05-16 RX ADMIN — HEPARIN SODIUM SCH UNIT: 5000 INJECTION, SOLUTION INTRAVENOUS; SUBCUTANEOUS at 15:17

## 2017-05-16 RX ADMIN — DOXYCYCLINE HYCLATE SCH MG: 100 TABLET ORAL at 21:54

## 2017-05-16 RX ADMIN — HEPARIN SODIUM SCH UNIT: 5000 INJECTION, SOLUTION INTRAVENOUS; SUBCUTANEOUS at 21:54

## 2017-05-16 RX ADMIN — DOXYCYCLINE HYCLATE SCH MG: 100 TABLET ORAL at 09:16

## 2017-05-16 RX ADMIN — Medication SCH ML: at 05:25

## 2017-05-16 RX ADMIN — Medication SCH ML: at 21:54

## 2017-05-16 RX ADMIN — FUROSEMIDE SCH MG: 10 INJECTION, SOLUTION INTRAMUSCULAR; INTRAVENOUS at 18:08

## 2017-05-16 NOTE — PDOC CONSULTATION
Consultation


Consult Date: 05/16/17


Attending physician:: TANYA CABEZAS


Consult reason:: I was asked by Dr. Cabezas to see this patient because of 

chronic kidney disease.





History of Present Illness


Admission Date/PCP: 


  05/14/17 18:14





  





History of Present Illness: 


DIANA HUNT is a 76 year old male with past medical history chronic kidney 

disease, hypertension, BPH, pacemaker presents to the hospital with 3 days 

worsening left lower extremity pain and swelling.  Patient has somewhat chronic 

edema in both lower extremities.  Patient is currently being treated with IV 

antibiotics and IV Lasix.  According to the nurses the lower extremity edema 

has been improved.  In terms of the kidney function the patient came in with a 

BUN of 53 and creatinine of 2.68 with estimated GFR of 28 on May 14.  Today the 

patient has a BUN of 58 and creatinine of 3.23 with estimated GFR of 23.  

Records indicate that on April 10 the patient has a BUN of 81 creatinine of 

2.95 with estimated GFR of 25.  Patient has mildly low sodium of 133.2.





When I came to the room today to talk to the patient, the patient is sleeping 

with a BiPAP on.  I was able to wake him up however the patient could not keep 

himself awake and keep dozing off after answering a couple of questions.  He 

gave me very minimal additional information regarding his kidney disease.  He 

confirmed to me that he knows that he has to 25% kidney function.  He tells me 

that [facility] but he has never seen a nephrologist 

before.  He denies any problem with urination currently.  He denies any history 

of kidney stones, hematuria, nor history of any hepatitis in the past.  When I 

ask him about what the [facility] told him about the possible cause of his 

kidney disease, he could not really tell me.








Past Medical History


Cardiac Medical History: Reports: CHF-Systolic, DVT, Hypertension-primary, 

Pulmonary Embolism


Pulmonary Medical History: Reports: Chronic Obstructive Pulmonary Disease (COPD)

, Sleep Apnea


Renal/ Medical History: Reports: Benign Prostatic Hyperplasia, Chronic Kidney 

Disease Stage IV


GI Medical History: Reports: Diverticulitis


Musculoskeltal Medical History: Reports: Arthritis, Gout





Past Surgical History


Past Surgical History: Reports: Cholecystectomy, Colostomy - And revision, 

Pacemaker, Tonsillectomy, Other - Dialysis shunt and revision





Social History


Information Source: [facility] Records


Smoking Status: Never Smoker


Frequency of Alcohol Use: None


Hx Recreational Drug Use: No


Hx Prescription Drug Abuse: No





- Advance Directive


Resuscitation Status: Full Code





Family History


Family History: Reviewed & Not Pertinent


Parental Family History Reviewed: Yes


Children Family History Reviewed: Unknown


Sibling(s) Family History Reviewed.: Unknown





Medication/Allergy


Home Medications: 








Acetaminophen [Tylenol 325 mg Tablet] 650 mg PO Q8HP PRN 05/15/17 


Allopurinol [Zyloprim] 300 mg PO DAILY 05/15/17 


Ascorbic Acid [Vitamin C 500 mg Tablet] 500 mg PO DAILY 05/15/17 


Calcitriol [Rocaltrol 0.25 mcg Capsule] 0.25 mcg PO DAILY 05/15/17 


Folic Acid [Folvite 1 mg Tablet] 1 mg PO DAILY 05/15/17 


Furosemide [Lasix] 40 mg PO BID 05/15/17 


Hydrocodone/Acetaminophen [Norco 5-325 mg Tablet] 1 tab PO Q8HP PRN 05/15/17 


Melatonin/Pyridoxine [Melatonin 5 mg Tablet] 5 mg PO QHS 05/15/17 


Methocarbamol [Robaxin 750 mg Tablet] 750 mg PO TIDP PRN 05/15/17 


Oxycodone HCl [Oxy-Ir 5 mg Tablet] 5 mg PO BIDP PRN 05/15/17 


Varenicline Tartrate [Chantix 1 mg Tablet] 1 mg PO BID 05/15/17 








Allergies/Adverse Reactions: 


 





shellfish derived Allergy (Verified 04/10/17 17:31)


 











Review of Systems


All systems: reviewed and no additional remarkable complaints except as stated


Review of Systems: 


Constitutional: ABSENT: chills, fatigue, fever(s), headache(s), weight gain, 

weight loss


Eyes: ABSENT: visual disturbances


Ears: ABSENT: hearing changes


Cardiovascular: ABSENT: chest pain, dyspnea on exertion, orthropnea, 

palpitations; admits lower extremity edema


Respiratory: ABSENT: cough, dyspnea, hemoptysis


Gastrointestinal: ABSENT: abdominal pain, constipation, diarrhea, hematemesis, 

hematochezia, nausea, vomiting


Genitourinary: ABSENT: dysuria, hematuria


Musculoskeletal: ABSENT: joint swelling


Integumentary: ABSENT: rash, wounds


Neurological: ABSENT: abnormal gait, abnormal speech, confusion, dizziness, 

focal weakness, numbness, syncope


Psychiatric: ABSENT: anxiety, depression


Endocrine: ABSENT: cold intolerance, heat intolerance, polydipsia, polyuria


Hematologic/Lymphatic: ABSENT: easy bleeding, easy bruising, lymphadenopathy





Physical Exam


Vital Signs: 


 











Temp Pulse Resp BP Pulse Ox


 


 98.2 F   69   19   111/83   100 


 


 05/16/17 15:23  05/16/17 15:23  05/16/17 15:23  05/16/17 15:23  05/16/17 15:23








 Intake & Output











 05/15/17 05/16/17 05/17/17





 06:59 06:59 06:59


 


Intake Total 305 1394 794


 


Output Total 400 325 725


 


Balance -95 1069 69


 


Weight  86.1 kg 











Exam: 


General appearance: no acute distress, very somnolent on BiPAP, well-developed, 

well-nourished


Head exam: PRESENT: atraumatic, normocephalic


Eye exam: PRESENT: Conjunctiva pale, EOMI, PERRLA.  ABSENT: conjunctival 

injection, scleral icterus


Mouth exam: PRESENT: moist, neck supple, tongue midline


Neck exam: PRESENT: full ROM.  ABSENT: carotid bruit, JVD, lymphadenopathy, 

thyromegaly


Respiratory exam: PRESENT: Diminished to auscultation bilaterally.  ABSENT: 

rales, rhonchi, stridor, wheezes


Cardiovascular exam: PRESENT: RRR, +S1, +S2.  ABSENT: systolic murmur


Pulses: PRESENT: normal radial pulses, normal dorsalis pedis pulses


GI/Abdominal exam: PRESENT: normal bowel sounds, soft.  ABSENT: guarding, mass, 

tenderness


Rectal exam: deferred


Extremities exam: PRESENT: full ROM.  Grade 1 bilateral lower extremity pitting 

edema, there is some warmth and erythema on his left lower extremity consistent 

with cellulitis ABSENT: calf tenderness


Musculoskeletal: PRESENT: full ROM.  ABSENT: deformity


Neurological exam: PRESENT: Somnolent but arousable for short period of time, 

reflexes normal, CN II-XII grossly intact.  ABSENT: motor sensory deficit


Psychiatric exam: PRESENT: appropriate affect, normal mood.  ABSENT: homicidal 

ideation, suicidal ideation


Skin exam: PRESENT: intact, dry, warm.  ABSENT: rash





Results


Laboratory Results: 


 





 05/16/17 06:08 





 05/16/17 06:08 





 











  05/16/17 05/16/17





  06:08 06:08


 


WBC  10.5 


 


RBC  3.78 L 


 


Hgb  10.1 L 


 


Hct  31.7 L 


 


MCV  84 


 


MCH  26.8 L 


 


MCHC  31.9 L 


 


RDW  20.4 H 


 


Plt Count  140 L 


 


Seg Neutrophils %  80.4 H 


 


Lymphocytes %  10.1 L 


 


Monocytes %  8.4 


 


Eosinophils %  0.7 


 


Basophils %  0.4 


 


Absolute Neutrophils  8.4 H 


 


Absolute Lymphocytes  1.1 


 


Absolute Monocytes  0.9 


 


Absolute Eosinophils  0.1 


 


Absolute Basophils  0.0 


 


Sodium   133.2 L


 


Potassium   4.6


 


Chloride   98


 


Carbon Dioxide   26


 


Anion Gap   9


 


BUN   58 H


 


Creatinine   3.23 H


 


Est GFR ( Amer)   23 L


 


Est GFR (Non-Af Amer)   19 L


 


Glucose   104


 


Calcium   7.6 L











Impressions: 


 





Chest X-Ray  05/14/17 13:18


IMPRESSION:  Left retrocardiac airspace disease either atelectasis or pneumonia.


 








Tibia/Fibula X-Ray  05/14/17 17:34


IMPRESSION:  NO SIGNIFICANT RADIOGRAPHIC ABNORMALITY.


 








Renal Ultrasound  05/16/17 00:00


IMPRESSION:  NORMAL RENAL AND BLADDER ULTRASOUND.


 














Assessment & Plan





- Diagnosis


(1) Chronic kidney disease (CKD), stage IV (severe)


Is this a current diagnosis for this admission?: YesPlan: 


This is most likely secondary to hypertensive nephrosclerosis and vascular 

disease.  Patient's currently nonoliguric.  He has very minimal proteinuria and 

no hematuria.  Mild worsening of his kidney function is expected due to 

diuresis.  However I think he needs to be on diuretics and it is possible that 

this is actually the patient's actual kidney function while on diuretics.  We 

may need further information regarding the patient's baseline kidney function 

and history regarding his kidney disease once the patient is a little bit more 

awake and communicative.





Continue current diuretics with Lasix IV of the same dose.  Monitor kidney 

function and urine output if possible.  I will not worry much with mild 

worsening of kidney function while on diuretics as well as the patient is 

clinically stable.  Patient does not need any renal replacement therapy.  

Patient definitely needs to be followed by a nephrologist.  He can either [facility] or I will be happy to see him in my office after 

discharge.  I will check the patient's urine for microalbumin and creatinine, 

phosphorus and PTH.








(2) Hypertensive nephrosclerosis





Is this a current diagnosis for this admission?: Yes





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


Is this a current diagnosis for this admission?: Yes





(4) Hyponatremia


Is this a current diagnosis for this admission?: YesPlan: 


Mild due to hypervolemic state.








(5) Edema





Is this a current diagnosis for this admission?: YesPlan: 


Improving with current dose of diuretics.








(6) Hypertension





Is this a current diagnosis for this admission?: Yes





(7) Left leg cellulitis


Is this a current diagnosis for this admission?: Yes





(8) Obstructive sleep apnea


Is this a current diagnosis for this admission?: Yes








- Notes


Notes: 


Thank you very much for this consultation.  After discharge the patient should 

follow-up with the nephrologist or in my clinic in 2-3 weeks.





- Time


Time Spent: 50 to 70 Minutes

## 2017-05-16 NOTE — PDOC PROGRESS REPORT
Subjective


Progress Note for:: 05/16/17


Subjective:: 


Left leg pain and swelling is improving.  Kidney function is worsening.  

Patient states that [facility] 

[facility] for this.





Patient denies fever, chills, headache, new focal weakness, chest pain, 

shortness of breath, abdominal pain, nausea, vomiting, diarrhea, constipation.





Physical Exam


Vital Signs: 


 











Temp Pulse Resp BP Pulse Ox


 


 98.3 F   69   15   117/56 L  97 


 


 05/16/17 07:15  05/16/17 07:15  05/16/17 07:15  05/16/17 07:15  05/16/17 07:15








 Intake & Output











 05/15/17 05/16/17 05/17/17





 06:59 06:59 06:59


 


Intake Total 305 1394 


 


Output Total 400 325 


 


Balance -95 1069 


 


Weight  86.1 kg 








GENERAL: No acute distress


HEENT: Conjunctiva clear, nonicteric, moist mucous membranes, no JVD, midline 

trachea


RESPIRATORY: Clear to auscultation bilaterally, no wheezes, no rhonchi


CARDIAC: Regular rate and rhythm, no murmurs/gallops/rubs


ABDOMEN: Soft, nondistended, nontender, positive bowel sounds, no rebound, no 

guarding


EXTREMETIES: Trace to 1+ bilateral lower extremity edema, much improved from 

admission


NEUROLOGIC: Alert, oriented to person/place/time, CN's grossly intact,  no 

focal deficits


SKIN: Erythema/induration left lower extremity improved


PSYCH: Normal mood, normal affect





Results


Laboratory Results: 


 





 05/16/17 06:08 





 05/16/17 06:08 





 











  05/16/17 05/16/17





  06:08 06:08


 


WBC  10.5 


 


RBC  3.78 L 


 


Hgb  10.1 L 


 


Hct  31.7 L 


 


MCV  84 


 


MCH  26.8 L 


 


MCHC  31.9 L 


 


RDW  20.4 H 


 


Plt Count  140 L 


 


Seg Neutrophils %  80.4 H 


 


Lymphocytes %  10.1 L 


 


Monocytes %  8.4 


 


Eosinophils %  0.7 


 


Basophils %  0.4 


 


Absolute Neutrophils  8.4 H 


 


Absolute Lymphocytes  1.1 


 


Absolute Monocytes  0.9 


 


Absolute Eosinophils  0.1 


 


Absolute Basophils  0.0 


 


Sodium   133.2 L


 


Potassium   4.6


 


Chloride   98


 


Carbon Dioxide   26


 


Anion Gap   9


 


BUN   58 H


 


Creatinine   3.23 H


 


Est GFR ( Amer)   23 L


 


Est GFR (Non-Af Amer)   19 L


 


Glucose   104


 


Calcium   7.6 L











Impressions: 


 





Chest X-Ray  05/14/17 13:18


IMPRESSION:  Left retrocardiac airspace disease either atelectasis or pneumonia.


 








Tibia/Fibula X-Ray  05/14/17 17:34


IMPRESSION:  NO SIGNIFICANT RADIOGRAPHIC ABNORMALITY.


 








Renal Ultrasound  05/16/17 00:00


IMPRESSION:  NORMAL RENAL AND BLADDER ULTRASOUND.


 














Assessment & Plan





- Diagnosis


(1) Left leg cellulitis


Is this a current diagnosis for this admission?: YesPlan: 











Continue IV clindamycin.  Blood cultures negative.





Unable to obtain MRI of the lower extremity secondary to pacemaker.  Unable to 

obtain CT scan with contrast secondary to chronic kidney disease and shellfish 

allergy.








(2) Pneumonia





Is this a current diagnosis for this admission?: YesPlan: 





Retrocardiac airspace disease noted on chest x-ray.  Likely bacterial.  

Continue oral doxycycline until 05/20/2017.








(3) Edema





Is this a current diagnosis for this admission?: YesPlan: 











Continue Lasix 40 mg IV every 12 hours.  Likely related to chronic kidney 

disease.  Echocardiogram normal.  Thyroid function studies normal.








(4) Chronic kidney disease (CKD)


Qualifiers: 


     Chronic kidney disease stage: unspecified stage     Qualified Code(s): 

N18.9 - Chronic kidney disease, unspecified  


Is this a current diagnosis for this admission?: YesPlan: 











Monitor kidney function closely with aggressive diuresis.  Renal ultrasound 

unremarkable.  I will consult nephrology given worsening of renal function.








(5) Hypertension





Is this a current diagnosis for this admission?: YesPlan: 





When necessary IV hydralazine.  Discontinue Toprol-XL secondary to low blood 

pressures and worsening renal function.








(6) History of pacemaker


Is this a current diagnosis for this admission?: Yes





(7) Obstructive sleep apnea


Is this a current diagnosis for this admission?: YesPlan: 


CPAP








(8) Urinary tract infection





Is this a current diagnosis for this admission?: YesPlan: 


Urine culture growing Escherichia coli and Pseudomonas.  Urinalysis however 

only had 2 white blood cells per high-powered field.  Patient is not 

symptomatic from this standpoint.  I think I will defer treatment for this 

culture as I don't think it represents a true infection, rather colonization.











- Time


Time Spent with patient: 25-34 minutes

## 2017-05-17 RX ADMIN — OXYCODONE HYDROCHLORIDE PRN MG: 5 TABLET ORAL at 22:43

## 2017-05-17 RX ADMIN — HEPARIN SODIUM SCH UNIT: 5000 INJECTION, SOLUTION INTRAVENOUS; SUBCUTANEOUS at 23:06

## 2017-05-17 RX ADMIN — HEPARIN SODIUM SCH UNIT: 5000 INJECTION, SOLUTION INTRAVENOUS; SUBCUTANEOUS at 14:00

## 2017-05-17 RX ADMIN — Medication SCH ML: at 14:00

## 2017-05-17 RX ADMIN — CLINDAMYCIN PHOSPHATE SCH ML: 12 INJECTION, SOLUTION INTRAVENOUS at 06:00

## 2017-05-17 RX ADMIN — Medication SCH ML: at 23:05

## 2017-05-17 RX ADMIN — LANSOPRAZOLE SCH MG: 30 TABLET, ORALLY DISINTEGRATING, DELAYED RELEASE ORAL at 08:00

## 2017-05-17 RX ADMIN — CLINDAMYCIN PHOSPHATE SCH ML: 12 INJECTION, SOLUTION INTRAVENOUS at 22:44

## 2017-05-17 RX ADMIN — DOXYCYCLINE HYCLATE SCH MG: 100 TABLET ORAL at 10:00

## 2017-05-17 RX ADMIN — Medication SCH ML: at 06:00

## 2017-05-17 RX ADMIN — FUROSEMIDE SCH MG: 10 INJECTION, SOLUTION INTRAMUSCULAR; INTRAVENOUS at 18:00

## 2017-05-17 RX ADMIN — HEPARIN SODIUM SCH UNIT: 5000 INJECTION, SOLUTION INTRAVENOUS; SUBCUTANEOUS at 06:00

## 2017-05-17 RX ADMIN — CLINDAMYCIN PHOSPHATE SCH ML: 12 INJECTION, SOLUTION INTRAVENOUS at 14:00

## 2017-05-17 RX ADMIN — DOXYCYCLINE HYCLATE SCH MG: 100 TABLET ORAL at 22:43

## 2017-05-17 RX ADMIN — FUROSEMIDE SCH MG: 10 INJECTION, SOLUTION INTRAMUSCULAR; INTRAVENOUS at 06:00

## 2017-05-17 NOTE — PROGRESS NOTE E
Progress Note



NAME: DIANA HUNT

MRN: [id number]

: 1940             AGE: 76Y

DATE:  2017           ROOM: 528



SUBJECTIVE:

The patient's left leg pain and swelling is improving, per his report.  He

really has no complaints.  He has no fevers, chills, headache, chest pain,

shortness of breath, abdominal pain, nausea or vomiting.



OBJECTIVE:

VITAL SIGNS:  Temperature 99.0.  Blood pressure 135/57.  Pulse 70. 

Respirations 18.  O2 saturation is 97% on room air.



GENERAL:  He is alert and frequently falls asleep while I am talking to

him but easily arousable and appropriate.  He states that he does this all

the time due to his sleep apnea.



HEENT:  Sclera is nonicteric.  Oropharynx has moist mucous membranes.



NECK:  No JVD.  Midline trachea.



RESPIRATORY:  Clear to auscultation.  No wheeze or rhonchi.



CARDIAC:  Regular rate and rhythm.



ABDOMEN:  Soft, nontender, obese.



EXTREMITIES:  With trace edema.



SKIN EXAMINATION:  Erythema and induration in left lower extremity is

markedly improved from admission.



LABORATORIES:

White blood count 10.9, hemoglobin 10.3, platelets 128,000.  Sodium 137,

potassium 5.0, chloride 102, bicarbonate 25, BUN 64, creatinine 3.24,

glucose 109.



ASSESSMENT AND PLAN:

1.  LEFT LOWER EXTREMITY CELLULITIS.  Continue IV antibiotics for now.

2.  ACUTE ON CHRONIC KIDNEY DISEASE, STAGE 4.  The patient is normally

followed by the [facility].  I have consulted Nephrology here due to

worsening renal function.

3.  ANEMIA.

4.  OBSTRUCTIVE SLEEP APNEA.  Continue CPAP.







DICTATING PHYSICIAN:  TANYA CABEZAS M.D.





[id number]                  DT: 2017    1445

PHY#: [id number]            DD: 2017    1423

ID:   [id number]           JOB#: [id number]       ACCT: [id number]



cc:TANYA CABEZAS

>

## 2017-05-17 NOTE — PROGRESS NOTE E
Progress Note



NAME: DIANA HUNT

MRN: [id number]

: 1940             AGE: 76Y

DATE: 2017            ROOM: 528



SUBJECTIVE:

The patient continues to be on the BiPAP, but he seems to be more awake

when aroused or prompted to respond to some questioning compared to

yesterday.  His nurse, Laurie, just told me that yesterday, he was actually

given oxycodone of 10 mg and today, he has not had any, so most definitely

that is probably playing a role in his mentation.  Other than that, he

denies any complaints at all.



OBJECTIVE:

VITAL SIGNS:  Temperature of 99, blood pressure 132/72, pulse rate of 70,

respirations of 18, oxygen saturation 98% on BiPAP.



GENERAL:  The patient was asleep, but arousable and pretty much more awake

when prompted and answers more questions even on the BiPAP.



HEENT:  He has slightly pale conjunctivae, anicteric sclerae.



NECK:  Supple.



LUNGS:  Diminished.  No crackles.  No wheezing.  No rhonchi.



HEART:  Regular rate and rhythm.  Positive S1 and S2.  No S3 or S4.



ABDOMEN:  Obese.  Positive bowel sounds.  Soft and nontender.  Benign.



EXTREMITIES:  He has still has grade 1 bilateral pitting edema, left

greater than the right.  He still has some erythema and warmth in the left

leg with tenderness to touch.



SKIN:  No jaundice or cyanosis.



DIAGNOSTIC DATA:

Labs today:  CBC with WBC of 10.9, hemoglobin of 10.3, hematocrit of 33.7,

platelet count of 128.  BMP:  Glucose 109.52, chloride of 102, potassium

of 5, sodium 137.55, CO2 of 25.69, phosphorus of 3.52, BUN of 54.09,

creatinine of 3.24, calcium of 7.94.



ASSESSMENT:

1.   CHRONIC KIDNEY DISEASE, STAGE 4.  PATIENT IS CURRENTLY NONOLIGURIC,

SLIGHTLY ELEVATED BUN BECAUSE OF DIURESIS, BUT OVERALL KIDNEY FUNCTION

REMAINS UNCHANGED AND STABLE.

2.   ANEMIA OF CHRONIC KIDNEY DISEASE.  STABLE.

3.   HYPONATREMIA, ACTUALLY IMPROVED AND RESOLVED.

4.   HYPERTENSION.  WELL CONTROLLED.

5.   LEFT LOWER EXTREMITY CELLULITIS.  ON ANTIBIOTICS.

6.   OBSTRUCTIVE SLEEP APNEA.



PLAN AND RECOMMENDATION:

Continue current management and treatment.  At this point, I do not think

we need to do anything further in terms of the kidney disease; however, I

reiterated with the patient that he needs to follow up regularly with a

nephrologist upon discharge.  He can either follow up with [facility]

[facility] or I would be happy to see him in my office 2-3 weeks after

discharge.  At this point, continue all other current management.  I will

not be available starting tonight until Monday morning.  If there are any

questions or concerns, Dr. Deuce Knight will be covering in my absence.





DICTATING PHYSICIAN:  SHAUNA PAULINO M.D.





[id number]                  DT: 2017    1615

PHY#: [id number]            DD: 2017    1604

ID:   [id number]           JOB#: [id number]       ACCT: [id number]



cc:

>

## 2017-05-18 LAB
ANION GAP SERPL CALC-SCNC: 10 MMOL/L (ref 5–19)
ANION GAP SERPL CALC-SCNC: 9 MMOL/L (ref 5–19)
ANISOCYTOSIS BLD QL SMEAR: (no result)
BASOPHILS # BLD AUTO: 0 10^3/UL (ref 0–0.2)
BASOPHILS # BLD AUTO: 0.1 10^3/UL (ref 0–0.2)
BASOPHILS NFR BLD AUTO: 0.4 % (ref 0–2)
BASOPHILS NFR BLD AUTO: 0.7 % (ref 0–2)
BUN SERPL-MCNC: 59 MG/DL (ref 7–20)
BUN SERPL-MCNC: 64 MG/DL (ref 7–20)
CALCIUM: 7.9 MG/DL (ref 8.4–10.2)
CALCIUM: 8.4 MG/DL (ref 8.4–10.2)
CHLORIDE SERPL-SCNC: 100 MMOL/L (ref 98–107)
CHLORIDE SERPL-SCNC: 102 MMOL/L (ref 98–107)
CO2 SERPL-SCNC: 26 MMOL/L (ref 22–30)
CO2 SERPL-SCNC: 28 MMOL/L (ref 22–30)
CREAT SERPL-MCNC: 3.05 MG/DL (ref 0.52–1.25)
CREAT SERPL-MCNC: 3.24 MG/DL (ref 0.52–1.25)
EOSINOPHIL # BLD AUTO: 0.1 10^3/UL (ref 0–0.6)
EOSINOPHIL # BLD AUTO: 0.1 10^3/UL (ref 0–0.6)
EOSINOPHIL NFR BLD AUTO: 0.7 % (ref 0–6)
EOSINOPHIL NFR BLD AUTO: 1.1 % (ref 0–6)
ERYTHROCYTE [DISTWIDTH] IN BLOOD BY AUTOMATED COUNT: 20.9 % (ref 11.5–14)
ERYTHROCYTE [DISTWIDTH] IN BLOOD BY AUTOMATED COUNT: 21.5 % (ref 11.5–14)
GLUCOSE SERPL-MCNC: 110 MG/DL (ref 75–110)
GLUCOSE SERPL-MCNC: 111 MG/DL (ref 75–110)
HCT VFR BLD CALC: 32.5 % (ref 37.9–51)
HCT VFR BLD CALC: 33.7 % (ref 37.9–51)
HGB BLD-MCNC: 10.3 G/DL (ref 13.5–17)
HGB BLD-MCNC: 10.5 G/DL (ref 13.5–17)
HGB HCT DIFFERENCE: -1
HGB HCT DIFFERENCE: -2.8
LYMPHOCYTES # BLD AUTO: 1 10^3/UL (ref 0.5–4.7)
LYMPHOCYTES # BLD AUTO: 1.2 10^3/UL (ref 0.5–4.7)
LYMPHOCYTES NFR BLD AUTO: 11 % (ref 13–45)
LYMPHOCYTES NFR BLD AUTO: 12.2 % (ref 13–45)
MCH RBC QN AUTO: 26.2 PG (ref 27–33.4)
MCH RBC QN AUTO: 27.1 PG (ref 27–33.4)
MCHC RBC AUTO-ENTMCNC: 30.5 G/DL (ref 32–36)
MCHC RBC AUTO-ENTMCNC: 32.5 G/DL (ref 32–36)
MCV RBC AUTO: 83 FL (ref 80–97)
MCV RBC AUTO: 86 FL (ref 80–97)
MONOCYTES # BLD AUTO: 0.8 10^3/UL (ref 0.1–1.4)
MONOCYTES # BLD AUTO: 1.1 10^3/UL (ref 0.1–1.4)
MONOCYTES NFR BLD AUTO: 9.7 % (ref 3–13)
MONOCYTES NFR BLD AUTO: 9.9 % (ref 3–13)
NEUTROPHILS # BLD AUTO: 6 10^3/UL (ref 1.7–8.2)
NEUTROPHILS # BLD AUTO: 8.4 10^3/UL (ref 1.7–8.2)
NEUTS SEG NFR BLD AUTO: 76.4 % (ref 42–78)
NEUTS SEG NFR BLD AUTO: 77.9 % (ref 42–78)
OVALOCYTES BLD QL SMEAR: (no result)
PHOSPHATE SERPL-MCNC: 3.5 MG/DL (ref 2.5–4.5)
PLATELET CLUMP BLD QL SMEAR: PRESENT
POIKILOCYTOSIS BLD QL SMEAR: (no result)
POLYCHROMASIA BLD QL SMEAR: SLIGHT
POTASSIUM SERPL-SCNC: 4.6 MMOL/L (ref 3.6–5)
POTASSIUM SERPL-SCNC: 5 MMOL/L (ref 3.6–5)
RBC # BLD AUTO: 3.9 10^6/UL (ref 4.35–5.55)
RBC # BLD AUTO: 3.94 10^6/UL (ref 4.35–5.55)
SODIUM SERPL-SCNC: 137 MMOL/L (ref 137–145)
SODIUM SERPL-SCNC: 137.6 MMOL/L (ref 137–145)
WBC # BLD AUTO: 10.9 10^3/UL (ref 4–10.5)
WBC # BLD AUTO: 7.8 10^3/UL (ref 4–10.5)

## 2017-05-18 RX ADMIN — HEPARIN SODIUM SCH UNIT: 5000 INJECTION, SOLUTION INTRAVENOUS; SUBCUTANEOUS at 15:08

## 2017-05-18 RX ADMIN — DOXYCYCLINE HYCLATE SCH MG: 100 TABLET ORAL at 12:53

## 2017-05-18 RX ADMIN — FUROSEMIDE SCH MG: 10 INJECTION, SOLUTION INTRAMUSCULAR; INTRAVENOUS at 07:17

## 2017-05-18 RX ADMIN — Medication SCH ML: at 23:23

## 2017-05-18 RX ADMIN — LANSOPRAZOLE SCH MG: 30 TABLET, ORALLY DISINTEGRATING, DELAYED RELEASE ORAL at 12:53

## 2017-05-18 RX ADMIN — CLINDAMYCIN PHOSPHATE SCH ML: 12 INJECTION, SOLUTION INTRAVENOUS at 07:16

## 2017-05-18 RX ADMIN — HEPARIN SODIUM SCH UNIT: 5000 INJECTION, SOLUTION INTRAVENOUS; SUBCUTANEOUS at 07:09

## 2017-05-18 RX ADMIN — Medication SCH ML: at 07:17

## 2017-05-18 RX ADMIN — PROBIOTIC PRODUCT - TAB SCH MG: TAB at 17:18

## 2017-05-18 RX ADMIN — CLINDAMYCIN HYDROCHLORIDE SCH MG: 150 CAPSULE ORAL at 12:53

## 2017-05-18 RX ADMIN — CLINDAMYCIN HYDROCHLORIDE SCH MG: 150 CAPSULE ORAL at 17:18

## 2017-05-18 RX ADMIN — Medication SCH ML: at 15:10

## 2017-05-18 RX ADMIN — DOXYCYCLINE HYCLATE SCH MG: 100 TABLET ORAL at 23:22

## 2017-05-18 RX ADMIN — CLINDAMYCIN HYDROCHLORIDE SCH MG: 150 CAPSULE ORAL at 23:23

## 2017-05-18 RX ADMIN — HEPARIN SODIUM SCH UNIT: 5000 INJECTION, SOLUTION INTRAVENOUS; SUBCUTANEOUS at 23:25

## 2017-05-18 NOTE — PROGRESS NOTE E
Progress Note



NAME: DIANA HUNT

MRN: [id number]

: 1940             AGE: 76Y

DATE: 2017            ROOM: 528



SUBJECTIVE:

The patient feels as though his left leg is improving with regard to pain

and swelling.  He is still not really mobilizing and has basically been in

bed the entire admission.  He required moderate assistance with physical

therapy to get up during their evaluation 2 days ago.  He normally lives

independently.  He denies fever, chills, headache, chest pain, shortness

of breath, abdominal pain, nausea, vomiting.



OBJECTIVE:

VITAL SIGNS:  Temperature 98.7, blood pressure 132/70, pulse 70,

respirations 18.



GENERAL:  He is alert and in no apparent distress, oriented x3.



HEENT:  Sclerae are nonicteric.  Conjunctivae clear.  Oropharynx has moist

mucous membranes.



NECK:  No JVD.



RESPIRATORY:  Clear to auscultation.  No wheezing or rhonchi.



ABDOMEN:  Obese, soft, nontender, nondistended.  Positive bowel sounds.



EXTREMITIES:  He has no edema.



SKIN:  Erythema and induration in the left pretibial area has markedly

improved from admission.



DIAGNOSTIC DATA:

Labs:  White blood count is 7.8, hemoglobin 10.5, hematocrit 32.5,

platelets 173.  Sodium 137, potassium 4.6, chloride 100, bicarb 28, BUN is

59, creatinine 3.05, glucose 111.  Hemoglobin A1c of 6.9.  Calcium 8.4.



ASSESSMENT AND PLAN:

 1.   Left lower extremity cellulitis.  The patient has responded to IV

     clindamycin nicely.  We will discontinue IV clindamycin and start

     patient on oral clindamycin.  Blood cultures were negative.  If the

     patient continues to be stable on oral medication, we will discharge

     him home in the next day or 2 depending on his mobility.

 2.   Pneumonia.  Chest x-ray showed retrocardiac airspace disease.  He is

     clinically stable on oral doxycycline.

 3.   Edema.  This is likely related to chronic kidney disease.  Echocardiogram

     was normal.  Thyroid function studies were normal.  Edema has now

     clinically resolved, so I will discontinue IV Lasix.  I will start

     the patient on maintenance dose of oral Lasix 80 mg q. morning.

 4.   Chronic kidney disease, stage 4.  The patient is followed by Nephrology

     at the [facility].  He will need to follow up with them as an

     outpatient.  Renal ultrasound was unremarkable.  He was seen by Dr. Valdes of Nephrology while in the hospital.  Creatinine was probably

     around baseline.

 5.   Hypertension.  Patient was taken off of blood pressure medicine this

     admission due to low blood pressures and worsening renal function. 

     Blood pressure is stable.

 6.   History of pacemaker.

 7.   Obstructive sleep apnea.  The patient has home CPAP.

 8.   Abnormal urine culture.  The patient only had 2 white blood cells per

     high-powered field on urinalysis.  I do not think this represents an

     infection.

 9.   Ambulatory dysfunction and generalized weakness.  Continue physical

     therapy.  The patient can discharge home once he is medically stable,

     but may need home health and home physical therapy services.







DICTATING PHYSICIAN:  TANYA CABEZAS M.D.





[id number]                  DT: 2017    1107

PHY#: [id number]            DD: 2017    1053

ID:   [id number]           JOB#: [id number]       ACCT: [id number]



cc:

>

## 2017-05-19 LAB
ANION GAP SERPL CALC-SCNC: 11 MMOL/L (ref 5–19)
BASOPHILS # BLD AUTO: 0 10^3/UL (ref 0–0.2)
BASOPHILS NFR BLD AUTO: 0.3 % (ref 0–2)
BUN SERPL-MCNC: 59 MG/DL (ref 7–20)
CALCIUM: 8.4 MG/DL (ref 8.4–10.2)
CHLORIDE SERPL-SCNC: 100 MMOL/L (ref 98–107)
CO2 SERPL-SCNC: 27 MMOL/L (ref 22–30)
CREAT SERPL-MCNC: 2.87 MG/DL (ref 0.52–1.25)
EOSINOPHIL # BLD AUTO: 0.1 10^3/UL (ref 0–0.6)
EOSINOPHIL NFR BLD AUTO: 1.2 % (ref 0–6)
ERYTHROCYTE [DISTWIDTH] IN BLOOD BY AUTOMATED COUNT: 21.7 % (ref 11.5–14)
GLUCOSE SERPL-MCNC: 119 MG/DL (ref 75–110)
HCT VFR BLD CALC: 33.7 % (ref 37.9–51)
HGB BLD-MCNC: 10.5 G/DL (ref 13.5–17)
HGB HCT DIFFERENCE: -2.2
LYMPHOCYTES # BLD AUTO: 0.8 10^3/UL (ref 0.5–4.7)
LYMPHOCYTES NFR BLD AUTO: 10.9 % (ref 13–45)
MCH RBC QN AUTO: 26.4 PG (ref 27–33.4)
MCHC RBC AUTO-ENTMCNC: 31.1 G/DL (ref 32–36)
MCV RBC AUTO: 85 FL (ref 80–97)
MONOCYTES # BLD AUTO: 0.8 10^3/UL (ref 0.1–1.4)
MONOCYTES NFR BLD AUTO: 11.2 % (ref 3–13)
NEUTROPHILS # BLD AUTO: 5.5 10^3/UL (ref 1.7–8.2)
NEUTS SEG NFR BLD AUTO: 76.4 % (ref 42–78)
POTASSIUM SERPL-SCNC: 4.8 MMOL/L (ref 3.6–5)
RBC # BLD AUTO: 3.98 10^6/UL (ref 4.35–5.55)
SODIUM SERPL-SCNC: 138.4 MMOL/L (ref 137–145)
WBC # BLD AUTO: 7.2 10^3/UL (ref 4–10.5)

## 2017-05-19 RX ADMIN — OXYCODONE HYDROCHLORIDE PRN MG: 5 TABLET ORAL at 21:46

## 2017-05-19 RX ADMIN — Medication SCH ML: at 06:38

## 2017-05-19 RX ADMIN — Medication SCH ML: at 21:34

## 2017-05-19 RX ADMIN — LANSOPRAZOLE SCH MG: 30 TABLET, ORALLY DISINTEGRATING, DELAYED RELEASE ORAL at 10:07

## 2017-05-19 RX ADMIN — DOXYCYCLINE HYCLATE SCH MG: 100 TABLET ORAL at 10:07

## 2017-05-19 RX ADMIN — PROBIOTIC PRODUCT - TAB SCH MG: TAB at 18:47

## 2017-05-19 RX ADMIN — CLINDAMYCIN HYDROCHLORIDE SCH MG: 150 CAPSULE ORAL at 06:38

## 2017-05-19 RX ADMIN — HEPARIN SODIUM SCH UNIT: 5000 INJECTION, SOLUTION INTRAVENOUS; SUBCUTANEOUS at 21:39

## 2017-05-19 RX ADMIN — DOCUSATE SODIUM SCH MG: 100 CAPSULE, LIQUID FILLED ORAL at 18:47

## 2017-05-19 RX ADMIN — CLINDAMYCIN HYDROCHLORIDE SCH MG: 150 CAPSULE ORAL at 12:04

## 2017-05-19 RX ADMIN — Medication SCH ML: at 14:48

## 2017-05-19 RX ADMIN — HEPARIN SODIUM SCH UNIT: 5000 INJECTION, SOLUTION INTRAVENOUS; SUBCUTANEOUS at 17:06

## 2017-05-19 RX ADMIN — HEPARIN SODIUM SCH UNIT: 5000 INJECTION, SOLUTION INTRAVENOUS; SUBCUTANEOUS at 06:32

## 2017-05-19 RX ADMIN — PROBIOTIC PRODUCT - TAB SCH MG: TAB at 10:07

## 2017-05-19 RX ADMIN — FUROSEMIDE SCH MG: 80 TABLET ORAL at 10:06

## 2017-05-19 NOTE — PROGRESS NOTE E
Progress Note



NAME: DIANA HUNT

MRN: [id number]

: 1940             AGE: 76Y

DATE:  2017                     ROOM: 528



TIME SPENT MANAGING PATIENT:

Thirty-five minutes.



SUBJECTIVE:

The patient still has a significant amount of pain in his left lower

extremity to the point that he cannot really participate in physical

therapy.  This may be slightly worse since discontinuing IV antibiotics

yesterday.  He denies fever or chills, nausea or vomiting, chest pain,

shortness of breath.



OBJECTIVE:

VITAL SIGNS:  Temperature 98.2, blood pressure 135/65, pulse 70,

respirations 19.



GENERAL:  He is alert, in no apparent distress, answers questions

appropriately.



HEENT:  Sclerae are nonicteric.  Conjunctivae are clear.  Oropharynx has

moist mucous membranes.



NECK:  No JVD.  Midline trachea.



RESPIRATORY:  Clear to auscultation, no wheezes or rhonchi.



CARDIAC:  Regular rate and rhythm.  No murmurs, gallops or rubs.



ABDOMEN:  Soft, nontender, nondistended.  Positive bowel sounds.



EXTREMITIES:  He has no edema in his right lower extremity.  He has trace

edema in left lower extremity.



SKIN:  Erythema and induration as well as tenderness in the left pretibial

region that has worsened over the past 24 hours.



MUSCULOSKELETAL:  The patient has pain with passive range of motion of the

left ankle radiating up into the calf in the tibial region.



LABORATORY DATA:

White blood count 7.2, hemoglobin 10.5, hematocrit 33.7, platelets 179. 

Sodium 138, potassium 4.8, chloride 100, bicarb 27, BUN 59, creatinine

2.87, glucose 119, calcium 8.4.



ASSESSMENT AND PLAN:

1.  LEFT LOWER EXTREMITY CELLULITIS.  I am concerned about a deep space

infection given worsening/persistent symptoms.  Would like to disk

continue clindamycin and doxycycline.  I would like to start the patient

on IV imipenem and IV vancomycin.  I will consult Dr. Galeas of

orthopedics.  I have discussed the case with Dr. Galeas.  He has

recommended that we do an ultrasound of the extremity to rule out deep

space fluid collection.  Unfortunately, we are unable to do an MRI

secondary to pacemaker.  We are unable to do a contrasted CT secondary to

stage 4 chronic kidney disease.

2.  PNEUMONIA.  The patient was noted to have retrocardiac airspace

disease on x-ray.  He is on adequate antibiotic coverage for soft tissue

infection anyway.

3.  EDEMA.  Much improved from admission.  The patient was taken off IV

Lasix yesterday and placed on maintenance dose of Lasix 80 mg daily.  This

is likely secondary to chronic kidney disease.  Renal ultrasound was

unremarkable.  Thyroid function studies were normal.  Echocardiogram was

normal.

4.  CHRONIC KIDNEY DISEASE STAGE 4.  Probably around baseline.  The

patient is followed by nephrology at the [facility].  Renal

ultrasound was unremarkable.  The patient is being followed by Dr. Valdes

of nephrology while in the hospital.

5.  HYPERTENSION.  Blood pressure is stable at this time.  The patient was

taken off of blood pressure medicine this admission due to low blood

pressure and worsening renal failure.

6.  HISTORY OF PACEMAKER.

7.  OBSTRUCTIVE SLEEP APNEA.  Continue CPAP.

8.  URINARY TRACT INFECTION WITH URINE GROWING E. COLI AND PSEUDOMONAS. 

This should be adequately covered by IV imipenem based on susceptibility.

9.  AMBULATORY DYSFUNCTION AND GENERALIZED WEAKNESS.  Continue physical

therapy.

 



DICTATING PHYSICIAN:  TANYA CABEZAS M.D.





[id number]                  DT: 2017    1648

PHY#: [id number]            DD: 2017    1632

ID:   [id number]           JOB#: [id number]       ACCT: [id number]



cc:

>

## 2017-05-19 NOTE — PDOC CONSULTATION
History of Present Illness


Admission Date/PCP: 


  05/14/17 18:14





  





History of Present Illness: 


DIANA HUNT is a 76 year old male with left leg pain.  He was admitted on 5/14/ 17 with cellulitis of the left leg.  He was started on IV clindamycin at that 

time for which she saw improvement he was then transitioned to p.o. and the 

redness swelling and pain returned.  Patient denies numbness or tingling.  Pain 

worse with motion.  Pain 5/10.  Denies fever chills or sweats.





Past Medical History


Cardiac Medical History: Reports: Congestive Heart Failure, DVT, Hypertension, 

Pulmonary Embolism


Pulmonary Medical History: Reports: Chronic Obstructive Pulmonary Disease (COPD)

, Sleep Apnea


Renal/ Medical History: Reports: Chronic Kidney Disease, End Stage Renal 

Disease - myglobinuria, rhabdomyolysis


GI Medical History: Reports: Diverticulitis


Musculoskeltal Medical History: Reports: Arthritis, Gout





Past Surgical History


Past Surgical History: Reports: Cholecystectomy, Colostomy - And revision, 

Pacemaker, Tonsillectomy, Other - Dialysis shunt and revision





Social History


Smoking Status: Never Smoker


Frequency of Alcohol Use: None


Hx Recreational Drug Use: No


Hx Prescription Drug Abuse: No





- Advance Directive


Resuscitation Status: Full Code





Family History


Family History: Reviewed & Not Pertinent


Parental Family History Reviewed: No


Children Family History Reviewed: No


Sibling(s) Family History Reviewed.: No





Medication/Allergy


Home Medications: 








Acetaminophen [Tylenol 325 mg Tablet] 650 mg PO Q8HP PRN 05/15/17 


Allopurinol [Zyloprim] 300 mg PO DAILY 05/15/17 


Ascorbic Acid [Vitamin C 500 mg Tablet] 500 mg PO DAILY 05/15/17 


Calcitriol [Rocaltrol 0.25 mcg Capsule] 0.25 mcg PO DAILY 05/15/17 


Folic Acid [Folvite 1 mg Tablet] 1 mg PO DAILY 05/15/17 


Furosemide [Lasix] 40 mg PO BID 05/15/17 


Hydrocodone/Acetaminophen [Norco 5-325 mg Tablet] 1 tab PO Q8HP PRN 05/15/17 


Melatonin/Pyridoxine [Melatonin 5 mg Tablet] 5 mg PO QHS 05/15/17 


Methocarbamol [Robaxin 750 mg Tablet] 750 mg PO TIDP PRN 05/15/17 


Oxycodone HCl [Oxy-Ir 5 mg Tablet] 5 mg PO BIDP PRN 05/15/17 


Varenicline Tartrate [Chantix 1 mg Tablet] 1 mg PO BID 05/15/17 








Allergies/Adverse Reactions: 


 





shellfish derived Allergy (Verified 04/10/17 17:31)


 











Review of Systems


Constitutional: ABSENT: chills, fever(s), headache(s), weight gain, weight loss


Eyes: ABSENT: visual disturbances


Ears: ABSENT: hearing changes


Cardiovascular: ABSENT: chest pain, dyspnea on exertion, edema, orthropnea, 

palpitations


Respiratory: ABSENT: cough, hemoptysis


Gastrointestinal: ABSENT: abdominal pain, constipation, diarrhea, hematemesis, 

hematochezia, nausea, vomiting


Genitourinary: ABSENT: dysuria, hematuria


Musculoskeletal: PRESENT: as per HPI


Integumentary: ABSENT: rash, wounds


Neurological: ABSENT: abnormal gait, abnormal speech, confusion, dizziness, 

focal weakness, syncope


Psychiatric: ABSENT: anxiety, depression, homidical ideation, suicidal ideation


Endocrine: ABSENT: cold intolerance, heat intolerance, menstrual abnormalities, 

polydipsia, polyuria


Hematologic/Lymphatic: ABSENT: easy bleeding, easy bruising, lymphadenopathy





Physical Exam


Vital Signs: 


 











Temp Pulse Resp BP Pulse Ox


 


 98.6 F   70   18   134/67 H  99 


 


 05/19/17 15:33  05/19/17 15:33  05/19/17 15:33  05/19/17 15:33  05/19/17 15:33








 Intake & Output











 05/18/17 05/19/17 05/20/17





 06:59 06:59 06:59


 


Intake Total 900 930 240


 


Output Total 800 700 


 


Balance 100 230 240


 


Weight 86.1 kg 129.7 kg 











General appearance: PRESENT: no acute distress, well-developed, well-nourished


Head exam: PRESENT: atraumatic, normocephalic


Eye exam: PRESENT: conjunctiva pink, PERRLA.  ABSENT: scleral icterus


Ear exam: PRESENT: normal external ear exam


Mouth exam: PRESENT: moist, tongue midline


Neck exam: PRESENT: full ROM.  ABSENT: carotid bruit, JVD, lymphadenopathy, 

thyromegaly


Respiratory exam: PRESENT: unlabored


Cardiovascular exam: PRESENT: RRR.  ABSENT: diastolic murmur, rubs, systolic 

murmur


Pulses: PRESENT: normal dorsalis pedis pul, +2 pedal pulses bilateral


Vascular exam: PRESENT: normal capillary refill


GI/Abdominal exam: PRESENT: normal bowel sounds, soft.  ABSENT: distended, 

guarding, mass, organolmegaly, rebound, tenderness


Rectal exam: PRESENT: deferred


Musculoskeletal exam: PRESENT: other - Left lower extremity: Erythema along 

pretibial area with tenderness to palpation.  Pain with forced passive plantar 

flexion of the foot.  Dorsalis pedis pulse 2+.  Intact sensation.  Compartments 

soft and compressible no sign of compartment syndrome.  No palpable fluctuance.


Neurological exam: PRESENT: alert, awake, oriented to person, oriented to place

, oriented to time, oriented to situation, CN II-XII grossly intact.  ABSENT: 

motor sensory deficit


Psychiatric exam: PRESENT: appropriate affect, normal mood.  ABSENT: homicidal 

ideation, suicidal ideation


Skin exam: PRESENT: dry, intact, warm.  ABSENT: cyanosis, rash





Results


Laboratory Results: 


 





 05/19/17 09:25 





 05/19/17 09:25 





 











  05/17/17 05/19/17 05/19/17





  05:50 09:25 09:25


 


WBC   7.2 


 


RBC   3.98 L 


 


Hgb   10.5 L 


 


Hct   33.7 L 


 


MCV   85 


 


MCH   26.4 L 


 


MCHC   31.1 L 


 


RDW   21.7 H 


 


Plt Count   179 


 


Seg Neutrophils %   76.4 


 


Lymphocytes %   10.9 L 


 


Monocytes %   11.2 


 


Eosinophils %   1.2 


 


Basophils %   0.3 


 


Absolute Neutrophils   5.5 


 


Absolute Lymphocytes   0.8 


 


Absolute Monocytes   0.8 


 


Absolute Eosinophils   0.1 


 


Absolute Basophils   0.0 


 


Sodium  137.6   138.4


 


Potassium  5.0   4.8


 


Chloride  102   100


 


Carbon Dioxide  26   27


 


Anion Gap  10   11


 


BUN  64 H   59 H


 


Creatinine  3.24 H   2.87 H


 


Est GFR ( Amer)  23 L   26 L


 


Est GFR (Non-Af Amer)  19 L   21 L


 


Glucose  110   119 H


 


Calcium  7.9 L   8.4


 


Phosphorus  3.5  











Impressions: 


 





Chest X-Ray  05/14/17 13:18


IMPRESSION:  Left retrocardiac airspace disease either atelectasis or pneumonia.


 








Tibia/Fibula X-Ray  05/14/17 17:34


IMPRESSION:  NO SIGNIFICANT RADIOGRAPHIC ABNORMALITY.


 








Renal Ultrasound  05/16/17 00:00


IMPRESSION:  NORMAL RENAL AND BLADDER ULTRASOUND.


 














Assessment & Plan





- Diagnosis


(1) Left leg cellulitis


Is this a current diagnosis for this admission?: YesPlan: 


On physical examination I do not appreciate underlying fluid collection but 

given fact patient has not seen significant improvement with antibiotics 

underlying abscess remains within the differential.  Unfortunately patient 

cannot receive a CT scan with contrast or MRI but I have recommended ultrasound 

to evaluate for possible deep fluid collection if MRI is negative would 

consider further antibiotic treatment.  In the meantime patient will continue 

IV antibiotics.

## 2017-05-20 RX ADMIN — HEPARIN SODIUM SCH UNIT: 5000 INJECTION, SOLUTION INTRAVENOUS; SUBCUTANEOUS at 22:02

## 2017-05-20 RX ADMIN — Medication SCH ML: at 05:14

## 2017-05-20 RX ADMIN — Medication SCH ML: at 22:03

## 2017-05-20 RX ADMIN — CLINDAMYCIN HYDROCHLORIDE SCH MG: 150 CAPSULE ORAL at 23:50

## 2017-05-20 RX ADMIN — FUROSEMIDE SCH MG: 80 TABLET ORAL at 09:49

## 2017-05-20 RX ADMIN — DOCUSATE SODIUM SCH MG: 100 CAPSULE, LIQUID FILLED ORAL at 09:49

## 2017-05-20 RX ADMIN — CLINDAMYCIN HYDROCHLORIDE SCH MG: 150 CAPSULE ORAL at 13:13

## 2017-05-20 RX ADMIN — PROBIOTIC PRODUCT - TAB SCH MG: TAB at 18:16

## 2017-05-20 RX ADMIN — PROBIOTIC PRODUCT - TAB SCH MG: TAB at 09:50

## 2017-05-20 RX ADMIN — HEPARIN SODIUM SCH UNIT: 5000 INJECTION, SOLUTION INTRAVENOUS; SUBCUTANEOUS at 05:14

## 2017-05-20 RX ADMIN — LANSOPRAZOLE SCH MG: 30 TABLET, ORALLY DISINTEGRATING, DELAYED RELEASE ORAL at 07:57

## 2017-05-20 RX ADMIN — DOCUSATE SODIUM SCH MG: 100 CAPSULE, LIQUID FILLED ORAL at 18:15

## 2017-05-20 RX ADMIN — HEPARIN SODIUM SCH UNIT: 5000 INJECTION, SOLUTION INTRAVENOUS; SUBCUTANEOUS at 13:14

## 2017-05-20 RX ADMIN — CLINDAMYCIN HYDROCHLORIDE SCH MG: 150 CAPSULE ORAL at 18:16

## 2017-05-20 RX ADMIN — Medication SCH ML: at 13:15

## 2017-05-20 NOTE — PDOC PROGRESS REPORT
Subjective


Progress Note for:: 05/20/17


Subjective:: 


Patient seen on morning rounds. He is resting in bed.  He awakens easily to 

verbal stimuli.  He complains of pain most of his joints from laying in bed.  

He denies any shortness of breath, cough or dyspnea.  He denies any chest pain, 

palpitations or headache.  He denies any nausea, vomiting or abdominal pain.  

Remaining review of systems are negative.





Physical Exam


Vital Signs: 


 











Temp Pulse Resp BP Pulse Ox


 


 98.7 F   69   19   114/63   96 


 


 05/20/17 11:07  05/20/17 11:07  05/20/17 11:07  05/20/17 11:07  05/20/17 11:07








 Intake & Output











 05/19/17 05/20/17 05/21/17





 06:59 06:59 06:59


 


Intake Total 930 490 120


 


Output Total 700  


 


Balance 230 490 120


 


Weight 129.7 kg 129.7 kg 











General appearance: PRESENT: no acute distress, obese, well-developed, well-

nourished


Head exam: PRESENT: atraumatic, normocephalic


Eye exam: PRESENT: conjunctiva pink, EOMI, PERRLA.  ABSENT: scleral icterus


Ear exam: PRESENT: normal external ear exam


Mouth exam: PRESENT: moist, tongue midline


Neck exam: ABSENT: carotid bruit, JVD, lymphadenopathy, thyromegaly


Respiratory exam: PRESENT: clear to auscultation alfonso.  ABSENT: rales, rhonchi, 

wheezes


Cardiovascular exam: PRESENT: RRR.  ABSENT: diastolic murmur, rubs, systolic 

murmur


Pulses: PRESENT: normal dorsalis pedis pul


Vascular exam: PRESENT: normal capillary refill


GI/Abdominal exam: PRESENT: normal bowel sounds, soft.  ABSENT: distended, 

guarding, mass, organolmegaly, rebound, tenderness


Rectal exam: PRESENT: deferred


Extremities exam: PRESENT: calf tenderness, +1 edema - left


Musculoskeletal exam: PRESENT: full ROM, tenderness


Neurological exam: PRESENT: alert, altered, oriented to person, oriented to time

, oriented to situation, normal gait


Psychiatric exam: PRESENT: appropriate affect, normal mood.  ABSENT: homicidal 

ideation, suicidal ideation


Skin exam: PRESENT: dry, intact, warm.  ABSENT: cyanosis, rash





Results


Laboratory Results: 


 





 05/19/17 09:25 





 05/19/17 09:25 








Impressions: 


 





Chest X-Ray  05/14/17 13:18


IMPRESSION:  Left retrocardiac airspace disease either atelectasis or pneumonia.


 








Tibia/Fibula X-Ray  05/14/17 17:34


IMPRESSION:  NO SIGNIFICANT RADIOGRAPHIC ABNORMALITY.


 








Renal Ultrasound  05/16/17 00:00


IMPRESSION:  NORMAL RENAL AND BLADDER ULTRASOUND.


 








Extremity Ultrasound  05/19/17 00:00


IMPRESSION:  SUBCUTANEOUS EDEMA WITHOUT DRAINABLE FLUID COLLECTION/ABSCESS.


 














Assessment & Plan





- Diagnosis


(1) Left leg cellulitis


Is this a current diagnosis for this admission?: YesPlan: 


Patient lost IV access last evening.  He has had no fever for the last 72 hours 

and no leukocytosis.  We will therefore change antibiotics to oral, and 

hopefully discharge home in the next 48 hours.








(2) Urinary tract infection





Is this a current diagnosis for this admission?: YesPlan: 


Treatment completed








(3) Chronic kidney disease (CKD), stage IV (severe)


Is this a current diagnosis for this admission?: YesPlan: 


Avoid nephrotoxic medications and dosages








(4) History of pacemaker


Is this a current diagnosis for this admission?: Yes





(5) Hypertension


Qualifiers: 


     Hypertension type: essential hypertension        Qualified Code(s): I10 - 

Essential (primary) hypertension  


Is this a current diagnosis for this admission?: YesPlan: 


Continue current medications 








(6) Obstructive sleep apnea


Is this a current diagnosis for this admission?: YesPlan: 


Continue CPAP








(7) Edema


Qualifiers: 


     Edema type: localized        Qualified Code(s): R60.0 - Localized edema  


Is this a current diagnosis for this admission?: YesPlan: 


Elevate lower extremities








(8) Hyponatremia


Is this a current diagnosis for this admission?: YesPlan: 


Resolved








(9) Anemia in chronic kidney disease (CKD)


Is this a current diagnosis for this admission?: YesPlan: 


Stable











- Time


Time Spent with patient: 25-34 minutes


Critical Time spent with patient: 15-24 minutes


Medications reviewed and adjusted accordingly: Yes


Anticipated discharge: Home with Homehealth

## 2017-05-20 NOTE — PDOC PROGRESS REPORT
Subjective


Progress Note for:: 05/20/17


Subjective:: 


Patient seen and evaluated on rounds today.  States his left lower extremity is 

somewhat unchanged since yesterday's examination.  Continues to have pain along 

the front of the tibia.  Denies fever chills or sweats.





Physical Exam


Vital Signs: 


 











Temp Pulse Resp BP Pulse Ox


 


 98.7 F   69   19   114/63   96 


 


 05/20/17 11:07  05/20/17 11:07  05/20/17 11:07  05/20/17 11:07  05/20/17 11:07








 Intake & Output











 05/19/17 05/20/17 05/21/17





 06:59 06:59 06:59


 


Intake Total 930 490 360


 


Output Total 700  


 


Balance 230 490 360


 


Weight 129.7 kg 129.7 kg 











Musculoskeletal exam: PRESENT: other - Left lower extremity: Pretibial erythema 

there is mild weeping of the skin.  No palpable fluctuance.  Intact plantar 

flexion/dorsiflexion.  Compartments soft and compressible no sign of 

compartment syndrome





Results


Laboratory Results: 


 





 05/19/17 09:25 





 05/19/17 09:25 








Impressions: 


 





Chest X-Ray  05/14/17 13:18


IMPRESSION:  Left retrocardiac airspace disease either atelectasis or pneumonia.


 








Tibia/Fibula X-Ray  05/14/17 17:34


IMPRESSION:  NO SIGNIFICANT RADIOGRAPHIC ABNORMALITY.


 








Renal Ultrasound  05/16/17 00:00


IMPRESSION:  NORMAL RENAL AND BLADDER ULTRASOUND.


 








Extremity Ultrasound  05/19/17 00:00


IMPRESSION:  SUBCUTANEOUS EDEMA WITHOUT DRAINABLE FLUID COLLECTION/ABSCESS.


 














Assessment & Plan





- Diagnosis


(1) Left leg cellulitis


Is this a current diagnosis for this admission?: YesPlan: 





On physical examination I do not appreciate underlying fluid collection but 

given fact patient has not seen significant improvement with antibiotics 

underlying abscess remains within the differential.  Unfortunately patient 

cannot receive a CT scan with contrast or MRI he has had a ultrasound which 

demonstrates subcutaneous edema no evidence of fluid collection.  At this point 

patient will continue p.o. clindamycin given the fact that IV has been lost.  

Will sign off on the patient will be happy to follow-up if condition changes.

## 2017-05-21 RX ADMIN — HEPARIN SODIUM SCH UNIT: 5000 INJECTION, SOLUTION INTRAVENOUS; SUBCUTANEOUS at 13:58

## 2017-05-21 RX ADMIN — Medication SCH ML: at 05:59

## 2017-05-21 RX ADMIN — PROBIOTIC PRODUCT - TAB SCH MG: TAB at 10:07

## 2017-05-21 RX ADMIN — DOCUSATE SODIUM SCH MG: 100 CAPSULE, LIQUID FILLED ORAL at 10:06

## 2017-05-21 RX ADMIN — Medication SCH ML: at 13:54

## 2017-05-21 RX ADMIN — LANSOPRAZOLE SCH MG: 30 TABLET, ORALLY DISINTEGRATING, DELAYED RELEASE ORAL at 07:55

## 2017-05-21 RX ADMIN — CLINDAMYCIN HYDROCHLORIDE SCH MG: 150 CAPSULE ORAL at 17:09

## 2017-05-21 RX ADMIN — CLINDAMYCIN HYDROCHLORIDE SCH MG: 150 CAPSULE ORAL at 12:32

## 2017-05-21 RX ADMIN — DOCUSATE SODIUM SCH MG: 100 CAPSULE, LIQUID FILLED ORAL at 17:10

## 2017-05-21 RX ADMIN — HEPARIN SODIUM SCH UNIT: 5000 INJECTION, SOLUTION INTRAVENOUS; SUBCUTANEOUS at 06:14

## 2017-05-21 RX ADMIN — HEPARIN SODIUM SCH UNIT: 5000 INJECTION, SOLUTION INTRAVENOUS; SUBCUTANEOUS at 21:42

## 2017-05-21 RX ADMIN — CLINDAMYCIN HYDROCHLORIDE SCH MG: 150 CAPSULE ORAL at 06:13

## 2017-05-21 RX ADMIN — FOLIC ACID SCH MG: 1 TABLET ORAL at 10:06

## 2017-05-21 RX ADMIN — Medication SCH ML: at 21:22

## 2017-05-21 RX ADMIN — ALLOPURINOL SCH MG: 300 TABLET ORAL at 10:06

## 2017-05-21 RX ADMIN — FUROSEMIDE SCH MG: 80 TABLET ORAL at 10:06

## 2017-05-21 RX ADMIN — CALCITRIOL SCH MCG: 0.25 CAPSULE, LIQUID FILLED ORAL at 10:06

## 2017-05-21 RX ADMIN — PROBIOTIC PRODUCT - TAB SCH MG: TAB at 17:10

## 2017-05-21 NOTE — PDOC PROGRESS REPORT
Subjective


Progress Note for:: 05/21/17


Subjective:: 


Patient seen on morning rounds. He is resting in bed.  He awakens easily to 

verbal stimuli. He continues to have some pain in left leg but feels it is 

better today.  He again complains of pain most of his joints from laying in 

bed. He states he was unable to get out of bed yesterday due to achiness. He 

states he feels better today and will try again.  He denies any shortness of 

breath, cough or dyspnea.  He denies any chest pain, palpitations or headache.  

He denies any nausea, vomiting or abdominal pain.  Remaining review of systems 

are negative.





Physical Exam


Vital Signs: 


 











Temp Pulse Resp BP Pulse Ox


 


 98.6 F   69   19   114/60   98 


 


 05/21/17 07:20  05/21/17 07:20  05/21/17 07:20  05/21/17 07:20  05/21/17 07:20








 Intake & Output











 05/20/17 05/21/17 05/22/17





 06:59 06:59 06:59


 


Intake Total 490 560 


 


Balance 490 560 


 


Weight 129.7 kg 129.5 kg 











General appearance: PRESENT: no acute distress, obese, well-developed, well-

nourished


Head exam: PRESENT: atraumatic, normocephalic


Eye exam: PRESENT: conjunctiva pink, EOMI, PERRLA.  ABSENT: scleral icterus


Ear exam: PRESENT: normal external ear exam


Mouth exam: PRESENT: moist, tongue midline


Respiratory exam: PRESENT: clear to auscultation alfonso.  ABSENT: rales, rhonchi, 

wheezes


Cardiovascular exam: PRESENT: RRR.  ABSENT: diastolic murmur, rubs, systolic 

murmur


Pulses: PRESENT: normal dorsalis pedis pul


Vascular exam: PRESENT: normal capillary refill


GI/Abdominal exam: PRESENT: normal bowel sounds, soft.  ABSENT: distended, 

guarding, mass, organolmegaly, rebound, tenderness


Rectal exam: PRESENT: deferred


Extremities exam: PRESENT: calf tenderness, +1 edema - left anterior lower leg


Musculoskeletal exam: PRESENT: full ROM, tenderness, other - small area of 

erythema on left anterior calf


Neurological exam: PRESENT: alert, awake, oriented to person, oriented to place

, oriented to time, oriented to situation, CN II-XII grossly intact.  ABSENT: 

motor sensory deficit


Psychiatric exam: PRESENT: appropriate affect, normal mood.  ABSENT: homicidal 

ideation, suicidal ideation


Skin exam: PRESENT: erythema - left lower leg, warm





Results


Laboratory Results: 


 





 05/19/17 09:25 





 05/19/17 09:25 





 











  05/17/17





  05:50


 


PTH Intact  











Impressions: 


 





Chest X-Ray  05/14/17 13:18


IMPRESSION:  Left retrocardiac airspace disease either atelectasis or pneumonia.


 








Tibia/Fibula X-Ray  05/14/17 17:34


IMPRESSION:  NO SIGNIFICANT RADIOGRAPHIC ABNORMALITY.


 








Renal Ultrasound  05/16/17 00:00


IMPRESSION:  NORMAL RENAL AND BLADDER ULTRASOUND.


 








Extremity Ultrasound  05/19/17 00:00


IMPRESSION:  SUBCUTANEOUS EDEMA WITHOUT DRAINABLE FLUID COLLECTION/ABSCESS.


 














Assessment & Plan





- Diagnosis


(1) Left leg cellulitis


Is this a current diagnosis for this admission?: YesPlan: 


Patient lost IV access last evening.  He has had no fever for the last 72 hours 

and no leukocytosis.  We will therefore change antibiotics to oral, and 

hopefully discharge home in the next 48 hours.








(2) Urinary tract infection





Is this a current diagnosis for this admission?: YesPlan: 


Treatment completed








(3) Chronic kidney disease (CKD), stage IV (severe)


Is this a current diagnosis for this admission?: YesPlan: 


Avoid nephrotoxic medications and dosages








(4) History of pacemaker


Is this a current diagnosis for this admission?: Yes





(5) Hypertension


Qualifiers: 


     Hypertension type: essential hypertension        Qualified Code(s): I10 - 

Essential (primary) hypertension  


Is this a current diagnosis for this admission?: YesPlan: 


Continue current medications 








(6) Obstructive sleep apnea


Is this a current diagnosis for this admission?: YesPlan: 


Continue CPAP








(7) Edema


Qualifiers: 


     Edema type: localized        Qualified Code(s): R60.0 - Localized edema  


Is this a current diagnosis for this admission?: YesPlan: 


Elevate lower extremities








(8) Hyponatremia


Is this a current diagnosis for this admission?: YesPlan: 


Resolved








(9) Anemia in chronic kidney disease (CKD)


Is this a current diagnosis for this admission?: YesPlan: 


Stable











- Time


Time Spent with patient: 25-34 minutes


Critical Time spent with patient: 15-24 minutes


Medications reviewed and adjusted accordingly: Yes


Anticipated discharge: Home with Homehealth

## 2017-05-22 LAB
ALBUMIN SERPL-MCNC: 2.6 G/DL (ref 3.5–5)
ALP SERPL-CCNC: 64 U/L (ref 38–126)
ALT SERPL-CCNC: 33 U/L (ref 21–72)
ANION GAP SERPL CALC-SCNC: 6 MMOL/L (ref 5–19)
AST SERPL-CCNC: 22 U/L (ref 17–59)
BASOPHILS # BLD AUTO: 0 10^3/UL (ref 0–0.2)
BASOPHILS NFR BLD AUTO: 0.4 % (ref 0–2)
BILIRUB DIRECT SERPL-MCNC: 0.6 MG/DL (ref 0–0.4)
BILIRUB SERPL-MCNC: 1 MG/DL (ref 0.2–1.3)
BUN SERPL-MCNC: 53 MG/DL (ref 7–20)
CALCIUM: 8.7 MG/DL (ref 8.4–10.2)
CHLORIDE SERPL-SCNC: 98 MMOL/L (ref 98–107)
CO2 SERPL-SCNC: 29 MMOL/L (ref 22–30)
CREAT SERPL-MCNC: 2.46 MG/DL (ref 0.52–1.25)
EOSINOPHIL # BLD AUTO: 0.1 10^3/UL (ref 0–0.6)
EOSINOPHIL NFR BLD AUTO: 1.4 % (ref 0–6)
ERYTHROCYTE [DISTWIDTH] IN BLOOD BY AUTOMATED COUNT: 21 % (ref 11.5–14)
GLUCOSE SERPL-MCNC: 95 MG/DL (ref 75–110)
HCT VFR BLD CALC: 32.2 % (ref 37.9–51)
HGB BLD-MCNC: 10.3 G/DL (ref 13.5–17)
HGB HCT DIFFERENCE: -1.3
LYMPHOCYTES # BLD AUTO: 1.1 10^3/UL (ref 0.5–4.7)
LYMPHOCYTES NFR BLD AUTO: 14.7 % (ref 13–45)
MCH RBC QN AUTO: 26.5 PG (ref 27–33.4)
MCHC RBC AUTO-ENTMCNC: 31.8 G/DL (ref 32–36)
MCV RBC AUTO: 83 FL (ref 80–97)
MONOCYTES # BLD AUTO: 0.8 10^3/UL (ref 0.1–1.4)
MONOCYTES NFR BLD AUTO: 10.7 % (ref 3–13)
NEUTROPHILS # BLD AUTO: 5.3 10^3/UL (ref 1.7–8.2)
NEUTS SEG NFR BLD AUTO: 72.8 % (ref 42–78)
POTASSIUM SERPL-SCNC: 4.7 MMOL/L (ref 3.6–5)
PROT SERPL-MCNC: 5.4 G/DL (ref 6.3–8.2)
RBC # BLD AUTO: 3.87 10^6/UL (ref 4.35–5.55)
SODIUM SERPL-SCNC: 133.1 MMOL/L (ref 137–145)
WBC # BLD AUTO: 7.3 10^3/UL (ref 4–10.5)

## 2017-05-22 RX ADMIN — HEPARIN SODIUM SCH UNIT: 5000 INJECTION, SOLUTION INTRAVENOUS; SUBCUTANEOUS at 22:19

## 2017-05-22 RX ADMIN — PROBIOTIC PRODUCT - TAB SCH MG: TAB at 19:18

## 2017-05-22 RX ADMIN — DOCUSATE SODIUM SCH MG: 100 CAPSULE, LIQUID FILLED ORAL at 09:52

## 2017-05-22 RX ADMIN — Medication SCH ML: at 15:56

## 2017-05-22 RX ADMIN — ALLOPURINOL SCH MG: 300 TABLET ORAL at 09:51

## 2017-05-22 RX ADMIN — PROBIOTIC PRODUCT - TAB SCH MG: TAB at 09:50

## 2017-05-22 RX ADMIN — FOLIC ACID SCH MG: 1 TABLET ORAL at 09:50

## 2017-05-22 RX ADMIN — DOCUSATE SODIUM SCH MG: 100 CAPSULE, LIQUID FILLED ORAL at 19:18

## 2017-05-22 RX ADMIN — HEPARIN SODIUM SCH UNIT: 5000 INJECTION, SOLUTION INTRAVENOUS; SUBCUTANEOUS at 05:17

## 2017-05-22 RX ADMIN — HEPARIN SODIUM SCH UNIT: 5000 INJECTION, SOLUTION INTRAVENOUS; SUBCUTANEOUS at 15:55

## 2017-05-22 RX ADMIN — CLINDAMYCIN HYDROCHLORIDE SCH MG: 150 CAPSULE ORAL at 15:55

## 2017-05-22 RX ADMIN — LANSOPRAZOLE SCH MG: 30 TABLET, ORALLY DISINTEGRATING, DELAYED RELEASE ORAL at 09:51

## 2017-05-22 RX ADMIN — CALCITRIOL SCH MCG: 0.25 CAPSULE, LIQUID FILLED ORAL at 09:51

## 2017-05-22 RX ADMIN — Medication SCH ML: at 22:18

## 2017-05-22 RX ADMIN — Medication SCH ML: at 05:06

## 2017-05-22 RX ADMIN — CLINDAMYCIN HYDROCHLORIDE SCH MG: 150 CAPSULE ORAL at 19:18

## 2017-05-22 RX ADMIN — FUROSEMIDE SCH MG: 80 TABLET ORAL at 09:50

## 2017-05-22 RX ADMIN — CLINDAMYCIN HYDROCHLORIDE SCH MG: 150 CAPSULE ORAL at 23:51

## 2017-05-22 RX ADMIN — CLINDAMYCIN HYDROCHLORIDE SCH MG: 150 CAPSULE ORAL at 05:17

## 2017-05-22 RX ADMIN — CLINDAMYCIN HYDROCHLORIDE SCH MG: 150 CAPSULE ORAL at 05:09

## 2017-05-22 NOTE — PDOC PROGRESS REPORT
Subjective


Progress Note for:: 05/22/17


Subjective:: 


Patient seen on morning rounds. He is resting in bed.  He awakens easily to 

verbal stimuli. He states pain in the left leg has improved.  He again 

complains of pain most of his joints from laying in bed. He states he was 

unable to get out of bed yesterday due to achiness. He states he feels better 

today and will try again.  He denies any shortness of breath, cough or dyspnea.

  He denies any chest pain, palpitations or headache.  He denies any nausea, 

vomiting or abdominal pain.  Remaining review of systems are negative. 





Physical Exam


Vital Signs: 


 











Temp Pulse Resp BP Pulse Ox


 


 98.3 F   70   18   123/75   99 


 


 05/22/17 12:36  05/22/17 12:36  05/22/17 12:36  05/22/17 12:36  05/22/17 12:36








 Intake & Output











 05/21/17 05/22/17 05/23/17





 06:59 06:59 06:59


 


Intake Total 560  


 


Output Total  2 


 


Balance 560 -2 


 


Weight 129.5 kg 127.8 kg 











General appearance: PRESENT: no acute distress, well-developed, well-nourished


Head exam: PRESENT: atraumatic, normocephalic


Eye exam: PRESENT: conjunctiva pink, EOMI, PERRLA.  ABSENT: scleral icterus


Ear exam: PRESENT: normal external ear exam


Mouth exam: PRESENT: moist, tongue midline


Neck exam: ABSENT: carotid bruit, JVD, lymphadenopathy, thyromegaly


Respiratory exam: PRESENT: clear to auscultation alfonso.  ABSENT: rales, rhonchi, 

wheezes


Cardiovascular exam: PRESENT: RRR.  ABSENT: diastolic murmur, rubs, systolic 

murmur


Pulses: PRESENT: normal dorsalis pedis pul


Vascular exam: PRESENT: normal capillary refill


GI/Abdominal exam: PRESENT: normal bowel sounds, soft.  ABSENT: distended, 

guarding, mass, organolmegaly, rebound, tenderness


Rectal exam: PRESENT: deferred


Extremities exam: PRESENT: full ROM.  ABSENT: calf tenderness, clubbing, pedal 

edema


Neurological exam: PRESENT: alert, awake, oriented to person, oriented to place

, oriented to time, oriented to situation, CN II-XII grossly intact.  ABSENT: 

motor sensory deficit


Psychiatric exam: PRESENT: appropriate affect, normal mood.  ABSENT: homicidal 

ideation, suicidal ideation


Skin exam: PRESENT: dry, intact, warm.  ABSENT: cyanosis, rash





Results


Laboratory Results: 


 





 05/22/17 04:41 





 05/22/17 04:41 





 











  05/22/17 05/22/17





  04:41 04:41


 


WBC  7.3 


 


RBC  3.87 L 


 


Hgb  10.3 L 


 


Hct  32.2 L 


 


MCV  83 


 


MCH  26.5 L 


 


MCHC  31.8 L 


 


RDW  21.0 H 


 


Plt Count  199 


 


Seg Neutrophils %  72.8 


 


Lymphocytes %  14.7 


 


Monocytes %  10.7 


 


Eosinophils %  1.4 


 


Basophils %  0.4 


 


Absolute Neutrophils  5.3 


 


Absolute Lymphocytes  1.1 


 


Absolute Monocytes  0.8 


 


Absolute Eosinophils  0.1 


 


Absolute Basophils  0.0 


 


Sodium   133.1 L


 


Potassium   4.7


 


Chloride   98


 


Carbon Dioxide   29


 


Anion Gap   6


 


BUN   53 H


 


Creatinine   2.46 H


 


Est GFR ( Amer)   31 L


 


Est GFR (Non-Af Amer)   26 L


 


Glucose   95


 


Calcium   8.7


 


Total Bilirubin   1.0


 


AST   22


 


ALT   33


 


Alkaline Phosphatase   64


 


Total Protein   5.4 L


 


Albumin   2.6 L











Impressions: 


 





Chest X-Ray  05/14/17 13:18


IMPRESSION:  Left retrocardiac airspace disease either atelectasis or pneumonia.


 








Tibia/Fibula X-Ray  05/14/17 17:34


IMPRESSION:  NO SIGNIFICANT RADIOGRAPHIC ABNORMALITY.


 








Renal Ultrasound  05/16/17 00:00


IMPRESSION:  NORMAL RENAL AND BLADDER ULTRASOUND.


 








Extremity Ultrasound  05/19/17 00:00


IMPRESSION:  SUBCUTANEOUS EDEMA WITHOUT DRAINABLE FLUID COLLECTION/ABSCESS.


 














Assessment & Plan





- Diagnosis


(1) Left leg cellulitis


Is this a current diagnosis for this admission?: YesPlan: 





Continue clindamycin, erythema has resolved. Edema is improving








(2) Urinary tract infection





Is this a current diagnosis for this admission?: YesPlan: 


Treated








(3) Chronic kidney disease (CKD), stage IV (severe)


Is this a current diagnosis for this admission?: YesPlan: 


Avoid nephrotoxic medications and dosages








(4) History of pacemaker


Is this a current diagnosis for this admission?: Yes





(5) Hypertension


Qualifiers: 


     Hypertension type: essential hypertension        Qualified Code(s): I10 - 

Essential (primary) hypertension  


Is this a current diagnosis for this admission?: YesPlan: 


Continue current medications 








(6) Obstructive sleep apnea


Is this a current diagnosis for this admission?: YesPlan: 


Continue CPAP








(7) Edema


Qualifiers: 


     Edema type: localized        Qualified Code(s): R60.0 - Localized edema  


Is this a current diagnosis for this admission?: YesPlan: 


Elevate lower extremities








(8) Hyponatremia


Is this a current diagnosis for this admission?: YesPlan: 


Resolved








(9) Anemia in chronic kidney disease (CKD)


Is this a current diagnosis for this admission?: YesPlan: 


Stable











- Time


Time Spent with patient: 25-34 minutes


Critical Time spent with patient: 15-24 minutes


Medications reviewed and adjusted accordingly: Yes


Anticipated discharge: Home with Homehealth

## 2017-05-23 RX ADMIN — CALCITRIOL SCH MCG: 0.25 CAPSULE, LIQUID FILLED ORAL at 10:12

## 2017-05-23 RX ADMIN — Medication SCH ML: at 06:25

## 2017-05-23 RX ADMIN — HEPARIN SODIUM SCH UNIT: 5000 INJECTION, SOLUTION INTRAVENOUS; SUBCUTANEOUS at 21:19

## 2017-05-23 RX ADMIN — HEPARIN SODIUM SCH UNIT: 5000 INJECTION, SOLUTION INTRAVENOUS; SUBCUTANEOUS at 15:11

## 2017-05-23 RX ADMIN — PROBIOTIC PRODUCT - TAB SCH MG: TAB at 17:35

## 2017-05-23 RX ADMIN — Medication SCH ML: at 21:19

## 2017-05-23 RX ADMIN — PROBIOTIC PRODUCT - TAB SCH MG: TAB at 10:12

## 2017-05-23 RX ADMIN — CLINDAMYCIN HYDROCHLORIDE SCH MG: 150 CAPSULE ORAL at 17:35

## 2017-05-23 RX ADMIN — CLINDAMYCIN HYDROCHLORIDE SCH MG: 150 CAPSULE ORAL at 06:26

## 2017-05-23 RX ADMIN — Medication SCH ML: at 15:11

## 2017-05-23 RX ADMIN — CLINDAMYCIN HYDROCHLORIDE SCH MG: 150 CAPSULE ORAL at 23:26

## 2017-05-23 RX ADMIN — FUROSEMIDE SCH MG: 80 TABLET ORAL at 10:13

## 2017-05-23 RX ADMIN — DOCUSATE SODIUM SCH MG: 100 CAPSULE, LIQUID FILLED ORAL at 10:12

## 2017-05-23 RX ADMIN — LANSOPRAZOLE SCH MG: 30 TABLET, ORALLY DISINTEGRATING, DELAYED RELEASE ORAL at 10:12

## 2017-05-23 RX ADMIN — HEPARIN SODIUM SCH UNIT: 5000 INJECTION, SOLUTION INTRAVENOUS; SUBCUTANEOUS at 06:26

## 2017-05-23 RX ADMIN — FOLIC ACID SCH MG: 1 TABLET ORAL at 10:12

## 2017-05-23 RX ADMIN — ALLOPURINOL SCH MG: 300 TABLET ORAL at 10:12

## 2017-05-23 RX ADMIN — CLINDAMYCIN HYDROCHLORIDE SCH MG: 150 CAPSULE ORAL at 14:50

## 2017-05-23 RX ADMIN — DOCUSATE SODIUM SCH MG: 100 CAPSULE, LIQUID FILLED ORAL at 17:35

## 2017-05-23 NOTE — PDOC PROGRESS REPORT
Subjective


Progress Note for:: 05/23/17


Subjective:: 


Patient seen on morning rounds. He is resting in bed on BIPAP.  He awakens 

easily to verbal stimuli. He states pain in the left leg has improved.  He 

again complains of pain most of his joints from laying in bed. He states he was 

unable to get out of bed yesterday due to achiness. He states he feels better 

today and will try again.  He denies any shortness of breath, cough or dyspnea.

  He denies any chest pain, palpitations or headache.  He denies any nausea, 

vomiting or abdominal pain.  Remaining review of systems are negative. 





Physical Exam


Vital Signs: 


 











Temp Pulse Resp BP Pulse Ox


 


 98.3 F   70   21 H  144/72 H  99 


 


 05/23/17 07:56  05/23/17 07:56  05/23/17 07:56  05/23/17 07:56  05/23/17 07:56








 Intake & Output











 05/22/17 05/23/17 05/24/17





 06:59 06:59 06:59


 


Intake Total  1860 


 


Output Total 2 277 


 


Balance -2 1583 


 


Weight 127.8 kg 124.1 kg 











General appearance: PRESENT: no acute distress, obese, well-developed, well-

nourished


Head exam: PRESENT: atraumatic, normocephalic


Eye exam: PRESENT: conjunctiva pink, EOMI, PERRLA.  ABSENT: scleral icterus


Ear exam: PRESENT: normal external ear exam


Mouth exam: PRESENT: moist, tongue midline


Neck exam: ABSENT: carotid bruit, JVD, lymphadenopathy, thyromegaly


Respiratory exam: PRESENT: clear to auscultation alfonso.  ABSENT: rales, rhonchi, 

wheezes


Pulses: PRESENT: normal dorsalis pedis pul


Vascular exam: PRESENT: normal capillary refill


GI/Abdominal exam: PRESENT: normal bowel sounds, soft.  ABSENT: distended, 

guarding, mass, organolmegaly, rebound, tenderness


Rectal exam: PRESENT: deferred


Extremities exam: PRESENT: full ROM, tenderness, other - resolving bruising 

over anterior left lower tibia, movable 2 cm "mass" on anterior left foot.  

ABSENT: calf tenderness, clubbing, pedal edema


Neurological exam: PRESENT: alert, awake, oriented to person, oriented to place

, oriented to time, oriented to situation, CN II-XII grossly intact.  ABSENT: 

motor sensory deficit


Psychiatric exam: PRESENT: appropriate affect, normal mood.  ABSENT: homicidal 

ideation, suicidal ideation


Skin exam: PRESENT: dry, intact, warm, other - resolving bruising over left 

lower tibial area.  ABSENT: cyanosis, rash





Results


Laboratory Results: 


 





 05/22/17 04:41 





 05/22/17 04:41 








Impressions: 


 





Chest X-Ray  05/14/17 13:18


IMPRESSION:  Left retrocardiac airspace disease either atelectasis or pneumonia.


 








Tibia/Fibula X-Ray  05/14/17 17:34


IMPRESSION:  NO SIGNIFICANT RADIOGRAPHIC ABNORMALITY.


 








Renal Ultrasound  05/16/17 00:00


IMPRESSION:  NORMAL RENAL AND BLADDER ULTRASOUND.


 








Extremity Ultrasound  05/19/17 00:00


IMPRESSION:  SUBCUTANEOUS EDEMA WITHOUT DRAINABLE FLUID COLLECTION/ABSCESS.


 














Assessment & Plan





- Diagnosis


(1) Left leg cellulitis


Is this a current diagnosis for this admission?: YesPlan: 





Continue clindamycin, erythema has resolved. Edema is improving








(2) Urinary tract infection





Is this a current diagnosis for this admission?: YesPlan: 


Treated








(3) Chronic kidney disease (CKD), stage IV (severe)


Is this a current diagnosis for this admission?: YesPlan: 


Avoid nephrotoxic medications and dosages








(4) History of pacemaker


Is this a current diagnosis for this admission?: Yes





(5) Hypertension


Qualifiers: 


     Hypertension type: essential hypertension        Qualified Code(s): I10 - 

Essential (primary) hypertension  


Is this a current diagnosis for this admission?: YesPlan: 


Continue current medications 








(6) Obstructive sleep apnea


Is this a current diagnosis for this admission?: YesPlan: 


Continue CPAP








(7) Edema


Qualifiers: 


     Edema type: localized        Qualified Code(s): R60.0 - Localized edema  


Is this a current diagnosis for this admission?: YesPlan: 


Elevate lower extremities








(8) Hyponatremia


Is this a current diagnosis for this admission?: YesPlan: 


Resolved








(9) Anemia in chronic kidney disease (CKD)


Is this a current diagnosis for this admission?: YesPlan: 


Stable











- Time


Time Spent with patient: 25-34 minutes


Medications reviewed and adjusted accordingly: Yes


Anticipated discharge: Acute Rehab


Within: when bed available

## 2017-05-23 NOTE — RADIOLOGY REPORT (SQ)
EXAM DESCRIPTION:  U/S EXTREMITY NONVASCULAR LTD



COMPLETED DATE/TIME:  5/23/2017 2:03 pm



REASON FOR STUDY:  2 cm mass left anterior foot



COMPARISON:  None.



TECHNIQUE:  Dynamic and static grayscale images acquired of the localized site of clinical concern an
d recorded on PACS. Additional selected color Doppler and spectral images recorded.

SITE OF CONCERN: Left anterior foot.



LIMITATIONS:  None.



FINDINGS:  SKIN AND SUBCUTANEOUS TISSUES: Skin thickening.  No underlying mass.

DEEP SOFT TISSUES/MUSCLES: No masses.  No fluid collections. No edema.

VASCULAR: No increased or decreased vascularity.  No occlusions.

OTHER: No other significant finding.



IMPRESSION:  NO SOFT TISSUE MASS, FLUID COLLECTION, OR FOREIGN BODY.



TECHNICAL DOCUMENTATION:  JOB ID:  [id number]

 2011 [employer]- All Rights Reserved

## 2017-05-24 LAB
ANION GAP SERPL CALC-SCNC: 13 MMOL/L (ref 5–19)
BASOPHILS # BLD AUTO: 0 10^3/UL (ref 0–0.2)
BASOPHILS NFR BLD AUTO: 0.6 % (ref 0–2)
BUN SERPL-MCNC: 42 MG/DL (ref 7–20)
CALCIUM: 9.1 MG/DL (ref 8.4–10.2)
CHLORIDE SERPL-SCNC: 97 MMOL/L (ref 98–107)
CO2 SERPL-SCNC: 26 MMOL/L (ref 22–30)
CREAT SERPL-MCNC: 2.7 MG/DL (ref 0.52–1.25)
EOSINOPHIL # BLD AUTO: 0.1 10^3/UL (ref 0–0.6)
EOSINOPHIL NFR BLD AUTO: 1.4 % (ref 0–6)
ERYTHROCYTE [DISTWIDTH] IN BLOOD BY AUTOMATED COUNT: 21.9 % (ref 11.5–14)
GLUCOSE SERPL-MCNC: 104 MG/DL (ref 75–110)
HCT VFR BLD CALC: 35.8 % (ref 37.9–51)
HGB BLD-MCNC: 11.5 G/DL (ref 13.5–17)
HGB HCT DIFFERENCE: -1.3
LYMPHOCYTES # BLD AUTO: 1.1 10^3/UL (ref 0.5–4.7)
LYMPHOCYTES NFR BLD AUTO: 17.1 % (ref 13–45)
MCH RBC QN AUTO: 26.6 PG (ref 27–33.4)
MCHC RBC AUTO-ENTMCNC: 32.1 G/DL (ref 32–36)
MCV RBC AUTO: 83 FL (ref 80–97)
MONOCYTES # BLD AUTO: 1 10^3/UL (ref 0.1–1.4)
MONOCYTES NFR BLD AUTO: 14.2 % (ref 3–13)
NEUTROPHILS # BLD AUTO: 4.5 10^3/UL (ref 1.7–8.2)
NEUTS SEG NFR BLD AUTO: 66.7 % (ref 42–78)
POTASSIUM SERPL-SCNC: 4.5 MMOL/L (ref 3.6–5)
RBC # BLD AUTO: 4.32 10^6/UL (ref 4.35–5.55)
SODIUM SERPL-SCNC: 136.3 MMOL/L (ref 137–145)
WBC # BLD AUTO: 6.7 10^3/UL (ref 4–10.5)

## 2017-05-24 RX ADMIN — Medication SCH ML: at 21:26

## 2017-05-24 RX ADMIN — PROBIOTIC PRODUCT - TAB SCH MG: TAB at 17:50

## 2017-05-24 RX ADMIN — DOCUSATE SODIUM SCH MG: 100 CAPSULE, LIQUID FILLED ORAL at 10:13

## 2017-05-24 RX ADMIN — HEPARIN SODIUM SCH UNIT: 5000 INJECTION, SOLUTION INTRAVENOUS; SUBCUTANEOUS at 14:47

## 2017-05-24 RX ADMIN — FUROSEMIDE SCH MG: 80 TABLET ORAL at 10:13

## 2017-05-24 RX ADMIN — CLINDAMYCIN HYDROCHLORIDE SCH MG: 150 CAPSULE ORAL at 23:22

## 2017-05-24 RX ADMIN — Medication SCH ML: at 14:47

## 2017-05-24 RX ADMIN — CLINDAMYCIN HYDROCHLORIDE SCH MG: 150 CAPSULE ORAL at 06:05

## 2017-05-24 RX ADMIN — PROBIOTIC PRODUCT - TAB SCH MG: TAB at 10:13

## 2017-05-24 RX ADMIN — Medication SCH ML: at 05:07

## 2017-05-24 RX ADMIN — CALCITRIOL SCH MCG: 0.25 CAPSULE, LIQUID FILLED ORAL at 10:13

## 2017-05-24 RX ADMIN — HEPARIN SODIUM SCH UNIT: 5000 INJECTION, SOLUTION INTRAVENOUS; SUBCUTANEOUS at 21:26

## 2017-05-24 RX ADMIN — LANSOPRAZOLE SCH MG: 30 TABLET, ORALLY DISINTEGRATING, DELAYED RELEASE ORAL at 10:12

## 2017-05-24 RX ADMIN — FOLIC ACID SCH MG: 1 TABLET ORAL at 10:13

## 2017-05-24 RX ADMIN — CLINDAMYCIN HYDROCHLORIDE SCH MG: 150 CAPSULE ORAL at 13:25

## 2017-05-24 RX ADMIN — CLINDAMYCIN HYDROCHLORIDE SCH MG: 150 CAPSULE ORAL at 17:50

## 2017-05-24 RX ADMIN — HEPARIN SODIUM SCH UNIT: 5000 INJECTION, SOLUTION INTRAVENOUS; SUBCUTANEOUS at 06:06

## 2017-05-24 RX ADMIN — DOCUSATE SODIUM SCH MG: 100 CAPSULE, LIQUID FILLED ORAL at 17:50

## 2017-05-24 RX ADMIN — ALLOPURINOL SCH MG: 300 TABLET ORAL at 10:13

## 2017-05-24 NOTE — PDOC PROGRESS REPORT
Subjective


Progress Note for:: 05/24/17


Subjective:: 


Patient seen on morning rounds. He is resting in bed on BIPAP.  He awakens 

easily to verbal stimuli. He states pain in the left leg has improved.  He 

again complains of pain most of his joints from laying in bed.  He denies any 

shortness of breath, cough or dyspnea.  He denies any chest pain, palpitations 

or headache.  He denies any nausea, vomiting or abdominal pain.  Remaining 

review of systems are negative. 





Physical Exam


Vital Signs: 


 











Temp Pulse Resp BP Pulse Ox


 


 98.6 F   70   16   135/73 H  100 


 


 05/24/17 07:43  05/24/17 07:43  05/24/17 07:43  05/24/17 07:43  05/24/17 07:43








 Intake & Output











 05/23/17 05/24/17 05/25/17





 06:59 06:59 06:59


 


Intake Total 1860 1270 


 


Output Total 277 250 


 


Balance 1583 1020 


 


Weight 124.1 kg 121.9 kg 











General appearance: PRESENT: no acute distress, obese, well-developed, well-

nourished


Head exam: PRESENT: atraumatic, normocephalic


Eye exam: PRESENT: conjunctiva pink, EOMI, PERRLA.  ABSENT: scleral icterus


Ear exam: PRESENT: normal external ear exam


Mouth exam: PRESENT: moist, tongue midline


Neck exam: ABSENT: carotid bruit, JVD, lymphadenopathy, thyromegaly


Respiratory exam: PRESENT: clear to auscultation alfonso.  ABSENT: rales, rhonchi, 

wheezes


Cardiovascular exam: PRESENT: RRR.  ABSENT: diastolic murmur, rubs, systolic 

murmur


Pulses: PRESENT: normal dorsalis pedis pul


Vascular exam: PRESENT: normal capillary refill


GI/Abdominal exam: PRESENT: normal bowel sounds, soft.  ABSENT: distended, 

guarding, mass, organolmegaly, rebound, tenderness


Rectal exam: PRESENT: deferred


Extremities exam: PRESENT: full ROM.  ABSENT: calf tenderness, clubbing, pedal 

edema


Neurological exam: PRESENT: alert, awake, oriented to person, oriented to place

, oriented to time, oriented to situation, CN II-XII grossly intact.  ABSENT: 

motor sensory deficit


Psychiatric exam: PRESENT: appropriate affect, normal mood.  ABSENT: homicidal 

ideation, suicidal ideation


Skin exam: PRESENT: dry, intact, warm.  ABSENT: cyanosis, rash





Results


Laboratory Results: 


 





 05/24/17 06:03 





 05/24/17 06:03 





 











  05/24/17 05/24/17





  06:03 06:03


 


WBC  6.7 


 


RBC  4.32 L 


 


Hgb  11.5 L 


 


Hct  35.8 L 


 


MCV  83 


 


MCH  26.6 L 


 


MCHC  32.1 


 


RDW  21.9 H 


 


Plt Count  293 


 


Seg Neutrophils %  66.7 


 


Lymphocytes %  17.1 


 


Monocytes %  14.2 H 


 


Eosinophils %  1.4 


 


Basophils %  0.6 


 


Absolute Neutrophils  4.5 


 


Absolute Lymphocytes  1.1 


 


Absolute Monocytes  1.0 


 


Absolute Eosinophils  0.1 


 


Absolute Basophils  0.0 


 


Sodium   136.3 L


 


Potassium   4.5


 


Chloride   97 L


 


Carbon Dioxide   26


 


Anion Gap   13


 


BUN   42 H


 


Creatinine   2.70 H


 


Est GFR ( Amer)   28 L


 


Est GFR (Non-Af Amer)   23 L


 


Glucose   104


 


Calcium   9.1











Impressions: 


 





Chest X-Ray  05/14/17 13:18


IMPRESSION:  Left retrocardiac airspace disease either atelectasis or pneumonia.


 








Tibia/Fibula X-Ray  05/14/17 17:34


IMPRESSION:  NO SIGNIFICANT RADIOGRAPHIC ABNORMALITY.


 








Renal Ultrasound  05/16/17 00:00


IMPRESSION:  NORMAL RENAL AND BLADDER ULTRASOUND.


 








Extremity Ultrasound  05/23/17 00:00


IMPRESSION:  NO SOFT TISSUE MASS, FLUID COLLECTION, OR FOREIGN BODY.


 














Assessment & Plan





- Diagnosis


(1) Left leg cellulitis


Is this a current diagnosis for this admission?: YesPlan: 








Continue clindamycin, erythema has resolved. Edema is resolving








(2) Urinary tract infection





Is this a current diagnosis for this admission?: YesPlan: 


Treated








(3) Chronic kidney disease (CKD), stage IV (severe)


Is this a current diagnosis for this admission?: YesPlan: 


Avoid nephrotoxic medications and dosages








(4) History of pacemaker


Is this a current diagnosis for this admission?: Yes





(5) Hypertension


Qualifiers: 


     Hypertension type: essential hypertension        Qualified Code(s): I10 - 

Essential (primary) hypertension  


Is this a current diagnosis for this admission?: YesPlan: 


Continue current medications 








(6) Obstructive sleep apnea


Is this a current diagnosis for this admission?: YesPlan: 


Continue CPAP








(7) Edema


Qualifiers: 


     Edema type: localized        Qualified Code(s): R60.0 - Localized edema  


Is this a current diagnosis for this admission?: YesPlan: 


Elevate lower extremities








(8) Hyponatremia


Is this a current diagnosis for this admission?: YesPlan: 


Resolved








(9) Anemia in chronic kidney disease (CKD)


Is this a current diagnosis for this admission?: YesPlan: 


Stable











- Time


Time Spent with patient: 25-34 minutes


Critical Time spent with patient: 15-24 minutes


Medications reviewed and adjusted accordingly: Yes


Anticipated discharge: Acute Rehab


Within: when bed available

## 2017-05-25 VITALS — DIASTOLIC BLOOD PRESSURE: 59 MMHG | SYSTOLIC BLOOD PRESSURE: 147 MMHG

## 2017-05-25 RX ADMIN — LANSOPRAZOLE SCH MG: 30 TABLET, ORALLY DISINTEGRATING, DELAYED RELEASE ORAL at 09:09

## 2017-05-25 RX ADMIN — FOLIC ACID SCH MG: 1 TABLET ORAL at 09:10

## 2017-05-25 RX ADMIN — FUROSEMIDE SCH MG: 80 TABLET ORAL at 09:10

## 2017-05-25 RX ADMIN — PROBIOTIC PRODUCT - TAB SCH MG: TAB at 09:10

## 2017-05-25 RX ADMIN — HEPARIN SODIUM SCH UNIT: 5000 INJECTION, SOLUTION INTRAVENOUS; SUBCUTANEOUS at 05:15

## 2017-05-25 RX ADMIN — CLINDAMYCIN HYDROCHLORIDE SCH MG: 150 CAPSULE ORAL at 12:42

## 2017-05-25 RX ADMIN — DOCUSATE SODIUM SCH MG: 100 CAPSULE, LIQUID FILLED ORAL at 17:16

## 2017-05-25 RX ADMIN — Medication SCH ML: at 13:36

## 2017-05-25 RX ADMIN — ALLOPURINOL SCH MG: 300 TABLET ORAL at 09:10

## 2017-05-25 RX ADMIN — HEPARIN SODIUM SCH UNIT: 5000 INJECTION, SOLUTION INTRAVENOUS; SUBCUTANEOUS at 13:36

## 2017-05-25 RX ADMIN — CALCITRIOL SCH MCG: 0.25 CAPSULE, LIQUID FILLED ORAL at 09:11

## 2017-05-25 RX ADMIN — DOCUSATE SODIUM SCH MG: 100 CAPSULE, LIQUID FILLED ORAL at 09:10

## 2017-05-25 RX ADMIN — PROBIOTIC PRODUCT - TAB SCH MG: TAB at 17:16

## 2017-05-25 RX ADMIN — CLINDAMYCIN HYDROCHLORIDE SCH MG: 150 CAPSULE ORAL at 17:16

## 2017-05-25 RX ADMIN — Medication SCH ML: at 05:15

## 2017-05-25 RX ADMIN — CLINDAMYCIN HYDROCHLORIDE SCH MG: 150 CAPSULE ORAL at 05:52

## 2017-05-25 NOTE — PDOC TRANSFER SUMMARY
General





- Admit/Disc Date/PCP


Admission Date/Primary Care Provider: 


  05/14/17 18:14





  





Discharge Date: 05/25/17





- Discharge Diagnosis


(1) Left leg cellulitis


Is this a current diagnosis for this admission?: YesSummary: 


Resolving. Needs 3 more days of oral Clindamycin








(2) Urinary tract infection


Is this a current diagnosis for this admission?: YesSummary: 


Treated and resolved








(3) Chronic kidney disease (CKD), stage IV (severe)


Is this a current diagnosis for this admission?: YesSummary: 


Stable.  Avoid nephrotoxic medications and dosages 








(4) History of pacemaker


Is this a current diagnosis for this admission?: Yes





(5) Hypertension


Is this a current diagnosis for this admission?: YesSummary: 


Continue current medications he is normotensive.








(6) Obstructive sleep apnea


Is this a current diagnosis for this admission?: YesSummary: 


Continue CPAP at at bedtime on patient's home CPAP machine on current settings.








(7) Edema


Is this a current diagnosis for this admission?: YesSummary: 


Resolved








(8) Hyponatremia


Is this a current diagnosis for this admission?: YesSummary: 


Secondary to fluid volume overload and is now resolved with diuresis








(9) Anemia in chronic kidney disease (CKD)


Is this a current diagnosis for this admission?: YesSummary: 


Stable











- Additional Information


Resuscitation Status: Full Code


Home Medications: 








Acetaminophen [Tylenol 325 mg Tablet] 650 mg PO Q8HP PRN 05/15/17 


Allopurinol [Zyloprim] 300 mg PO DAILY 05/15/17 


Ascorbic Acid [Vitamin C 500 mg Tablet] 500 mg PO DAILY 05/15/17 


Calcitriol [Rocaltrol 0.25 mcg Capsule] 0.25 mcg PO DAILY 05/15/17 


Folic Acid [Folvite 1 mg Tablet] 1 mg PO DAILY 05/15/17 


Furosemide [Lasix] 40 mg PO BID 05/15/17 


Hydrocodone/Acetaminophen [Norco 5-325 mg Tablet] 1 tab PO Q8HP PRN 05/15/17 


Melatonin/Pyridoxine [Melatonin 5 mg Tablet] 5 mg PO QHS 05/15/17 


Methocarbamol [Robaxin 750 mg Tablet] 750 mg PO TIDP PRN 05/15/17 


Oxycodone HCl [Oxy-Ir 5 mg Tablet] 5 mg PO BIDP PRN 05/15/17 


Varenicline Tartrate [Chantix 1 mg Tablet] 1 mg PO BID 05/15/17 











History of Present Illness


Admission Date/PCP: 


  05/14/17 18:14





  








Hospital Course


Hospital Course: 


Patient was admitted to the telemetry unit.  He was started on IV broad-

spectrum antibiotics after blood cultures 2 were obtained.  He was diuresed 

for increasing lower extremity edema.  No to have worsening BUN and creatinine, 

in the setting of chronic kidney disease stage IV.  Dr. Valdes, nephrology, 

was consulted.  She agreed with the hospitalist management of his chronic 

kidney disease stage IV.  IV antibiotics were continued for 3 days and then 

transitioned to oral antibiotics.  He was noted to have increasing redness of 

the left lower extremity and fever.  He was restarted on IV antibiotics.  

Orthopedics was consulted.  Dr. Galeas, patient in consult.  Ultrasound of the 

left leg was obtained.  There is no fluid collections identified. His 

leukocytosis had resolved. He lost IV access and after several attempts by 

nursing staff were unable to replace it. He was started on oral clindamycin 

therapy.  Left lower extremity erythema resolved. Physical therapy was 

consulted for mobility assistance. He was slow to progress.  After discussion 

with his daughter and son in law with whom he lives, along with his wife has 

dementi it was felt besti he go to a facility for short-term rehab prior to 

going home.  Discharge planning was consulted.  A bed offer has been made by 

[facility].





Physical Exam


Vital Signs: 


 











Temp Pulse Resp BP Pulse Ox


 


 98.0 F   70   20   147/59 H  100 


 


 05/25/17 11:11  05/25/17 14:00  05/25/17 11:11  05/25/17 11:11  05/25/17 11:11








 Intake & Output











 05/24/17 05/25/17 05/26/17





 06:59 06:59 06:59


 


Intake Total 1270 914 


 


Output Total 250  


 


Balance 1020 914 


 


Weight 121.9 kg 118.9 kg 











General appearance: PRESENT: no acute distress, obese, well-developed, well-

nourished


Head exam: PRESENT: atraumatic, normocephalic


Eye exam: PRESENT: conjunctiva pink, EOMI, PERRLA.  ABSENT: scleral icterus


Ear exam: PRESENT: normal external ear exam


Mouth exam: PRESENT: moist, tongue midline


Neck exam: ABSENT: carotid bruit, JVD, lymphadenopathy, thyromegaly


Respiratory exam: PRESENT: clear to auscultation alfonso.  ABSENT: rales, rhonchi, 

wheezes


Cardiovascular exam: PRESENT: RRR.  ABSENT: diastolic murmur, rubs, systolic 

murmur


Pulses: PRESENT: normal dorsalis pedis pul


Vascular exam: PRESENT: normal capillary refill


GI/Abdominal exam: PRESENT: normal bowel sounds, soft.  ABSENT: distended, 

guarding, mass, organolmegaly, rebound, tenderness


Rectal exam: PRESENT: deferred


Extremities exam: PRESENT: calf tenderness, other - Left lower anterior leg 

bruising improving to male


Musculoskeletal exam: PRESENT: ambulatory, tenderness


Neurological exam: PRESENT: alert, awake, oriented to person, oriented to place

, oriented to time, oriented to situation, CN II-XII grossly intact.  ABSENT: 

motor sensory deficit


Skin exam: PRESENT: dry, intact, warm, other - resolving echymosis over left 

anterior lower leg.  ABSENT: cyanosis, rash





Results


Laboratory Results: 


 





 05/24/17 06:03 





 05/24/17 06:03 








Impressions: 


 





Chest X-Ray  05/14/17 13:18


IMPRESSION:  Left retrocardiac airspace disease either atelectasis or pneumonia.


 








Tibia/Fibula X-Ray  05/14/17 17:34


IMPRESSION:  NO SIGNIFICANT RADIOGRAPHIC ABNORMALITY.


 








Renal Ultrasound  05/16/17 00:00


IMPRESSION:  NORMAL RENAL AND BLADDER ULTRASOUND.


 








Extremity Ultrasound  05/23/17 00:00


IMPRESSION:  NO SOFT TISSUE MASS, FLUID COLLECTION, OR FOREIGN BODY.


 














Transfer Plan





- Disposition


Transfer Plan: 


Transfer to [facility]





Qualifiers


**PATEINT BEING DISCHARGED WITH ANY OF THE FOLLOWING DIAGNOSIS?: No





Plan


Discharge Plan: 


Transfer to [facility]


Time Spent: Less than 30 Minutes

## 2017-06-20 ENCOUNTER — HOSPITAL ENCOUNTER (OUTPATIENT)
Dept: HOSPITAL 62 - END | Age: 77
End: 2017-06-20
Attending: INTERNAL MEDICINE
Payer: MEDICARE

## 2017-06-20 VITALS — SYSTOLIC BLOOD PRESSURE: 131 MMHG | DIASTOLIC BLOOD PRESSURE: 65 MMHG

## 2017-06-20 DIAGNOSIS — R11.0: Primary | ICD-10-CM

## 2017-06-27 ENCOUNTER — HOSPITAL ENCOUNTER (OUTPATIENT)
Dept: HOSPITAL 62 - END | Age: 77
Discharge: HOME | End: 2017-06-27
Attending: INTERNAL MEDICINE
Payer: MEDICARE

## 2017-06-27 VITALS — SYSTOLIC BLOOD PRESSURE: 115 MMHG | DIASTOLIC BLOOD PRESSURE: 66 MMHG

## 2017-06-27 DIAGNOSIS — Z79.899: ICD-10-CM

## 2017-06-27 DIAGNOSIS — R63.4: ICD-10-CM

## 2017-06-27 DIAGNOSIS — I10: ICD-10-CM

## 2017-06-27 DIAGNOSIS — E66.9: ICD-10-CM

## 2017-06-27 DIAGNOSIS — K29.80: ICD-10-CM

## 2017-06-27 DIAGNOSIS — G47.30: ICD-10-CM

## 2017-06-27 DIAGNOSIS — K26.9: ICD-10-CM

## 2017-06-27 DIAGNOSIS — D63.8: ICD-10-CM

## 2017-06-27 DIAGNOSIS — K29.50: Primary | ICD-10-CM

## 2017-06-27 DIAGNOSIS — I51.9: ICD-10-CM

## 2017-06-27 PROCEDURE — 43239 EGD BIOPSY SINGLE/MULTIPLE: CPT

## 2017-06-27 PROCEDURE — 88305 TISSUE EXAM BY PATHOLOGIST: CPT

## 2017-06-27 PROCEDURE — 0DB68ZX EXCISION OF STOMACH, VIA NATURAL OR ARTIFICIAL OPENING ENDOSCOPIC, DIAGNOSTIC: ICD-10-PCS | Performed by: INTERNAL MEDICINE

## 2017-06-27 PROCEDURE — 88342 IMHCHEM/IMCYTCHM 1ST ANTB: CPT

## 2017-06-27 NOTE — OPERATIVE REPORT
Operative Report


DATE OF SURGERY: 06/27/17


Operative Report: 


Pre-op diagnosis: Nausea and vomiting





Post-op diagnosis: Antral gastritis, duodenitis, and duodenal erosions





Surgery: Esophagogastroduodenoscopy with





Medications: Versed 2mg   Fentanyl  100mcg IV push





Tissue removed: Antral biopsy for pathology





Procedure: After informed consent obtained from patient, the throat was sprayed 

with Hurricane and conscious sedation was achieved.  The upper endoscope was 

inserted into the esophagus under direct vision and advanced into the stomach.  

The duodenum was entered and examined to the second part.  Endoscope was then 

slowly pulled out of the patient as the mucosa was examined into details.  

Patient tolerated procedure well.





Findings





Esophagus: Normal


                       Z-line at: 40 cm


Antrum: Moderate erythema noted in the prepyloric antrum


Body: Normal


Fundus: Normal


Duodenum first part: Moderate erythema with erosions noted in the duodenal bulb


Duodenum second part: Normal





Plan:


Await pathology.  Start omeprazole 20 mg daily


OPERATION: .

## 2018-03-04 ENCOUNTER — HOSPITAL ENCOUNTER (EMERGENCY)
Dept: HOSPITAL 62 - ER | Age: 78
Discharge: HOME | End: 2018-03-04
Payer: OTHER GOVERNMENT

## 2018-03-04 VITALS — SYSTOLIC BLOOD PRESSURE: 137 MMHG | DIASTOLIC BLOOD PRESSURE: 76 MMHG

## 2018-03-04 DIAGNOSIS — Z91.013: ICD-10-CM

## 2018-03-04 DIAGNOSIS — Z88.1: ICD-10-CM

## 2018-03-04 DIAGNOSIS — I12.9: ICD-10-CM

## 2018-03-04 DIAGNOSIS — I25.10: ICD-10-CM

## 2018-03-04 DIAGNOSIS — J44.9: ICD-10-CM

## 2018-03-04 DIAGNOSIS — M10.9: Primary | ICD-10-CM

## 2018-03-04 DIAGNOSIS — N18.4: ICD-10-CM

## 2018-03-04 PROCEDURE — 99283 EMERGENCY DEPT VISIT LOW MDM: CPT

## 2018-03-04 NOTE — ER DOCUMENT REPORT
ED General





- General


Chief Complaint: Knee Pain


Stated Complaint: LEFT KNEE PAIN


Time Seen by Provider: 03/04/18 08:37


Mode of Arrival: Ambulatory


Information source: Patient


Notes: 





77-year-old male presents with complaints of left knee swelling.  Patient has a 

history of gout, he is on allopurinol for gout, he states he took 40 mg of 

prednisone yesterday but it did not help his symptoms.  He denies any fevers or 

chills denies nausea vomiting or diarrhea admits that the left knee is swollen 

and painful with range of motion


TRAVEL OUTSIDE OF THE [place] IN LAST 30 DAYS: No





- HPI


Onset: Last week


Onset/Duration: Persistent, Worse


Quality of pain: Achy


Severity: Moderate


Pain Level: 2


Associated symptoms: Body/muscle aches


Exacerbated by: Movement, Walking


Relieved by: Denies


Similar symptoms previously: Yes


Recently seen / treated by doctor: Yes





- Related Data


Allergies/Adverse Reactions: 


 





bacitracin Allergy (Verified 03/04/18 08:04)


 


shellfish derived Allergy (Verified 03/04/18 08:04)


 











Past Medical History





- Social History


Smoking Status: Never Smoker


Cigarette use (# per day): No


Chew tobacco use (# tins/day): No


Smoking Education Provided: No


Family History: Reviewed & Not Pertinent





- Past Medical History


Cardiac Medical History: Reports: Hx Congestive Heart Failure, Hx Coronary 

Artery Disease, Hx DVT, Hx Hypertension, Hx Pulmonary Embolism


   Denies: Hx Heart Attack


Pulmonary Medical History: Reports: Hx COPD, Hx Pneumonia, Hx Sleep Apnea


   Denies: Hx Asthma, Hx Bronchitis


Neurological Medical History: Denies: Hx Cerebrovascular Accident, Hx Seizures


Renal/ Medical History: Reports: Hx Benign Prostatic Hyperplasia, Hx End 

Stage Renal Disease - myglobinuria, rhabdomyolysis.  Denies: Hx Peritoneal 

Dialysis


GI Medical History: Reports: Hx Diverticulitis, Hx Gastritis, Hx Colonoscopy, 

Hx Endoscopy


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 

Reports Hx Musculoskeletal Deformity, Reports Hx Musculoskeletal Trauma


Past Surgical History: Reports: Hx Bowel Surgery, Hx Cholecystectomy, Hx 

Colostomy - And revision, Hx Pacemaker, Hx Tonsillectomy, Other - Dialysis 

shunt and revision





- Immunizations


Immunizations up to date: Yes


Hx Diphtheria, Pertussis, Tetanus Vaccination: Yes





Review of Systems





- Review of Systems


Notes: 





REVIEW OF SYSTEMS:


CONSTITUTIONAL :  Denies fever,  chills, or sweats.  Denies recent illness.


EENT:   Denies eye, ear, throat, or mouth pain or symptoms.  Denies nasal or 

sinus congestion or discharge.  Denies throat, tongue, or mouth swelling or 

difficulty swallowing.


CARDIOVASCULAR:  Denies chest pain.  Denies palpitations or racing or irregular 

heart beat.  Denies ankle edema.


RESPIRATORY:  Denies cough, cold, or chest congestion.  Denies shortness of 

breath, difficulty breathing, or wheezing.


GASTROINTESTINAL:  Denies abdominal pain or distention.  Denies nausea, vomiting

, or diarrhea.  Denies blood in vomitus, stools, or per rectum.  Denies black, 

tarry stools.  Denies constipation.  


GENITOURINARY:  Denies difficulty urinating, painful urination, burning, 

frequency, blood in urine, or discharge.


MUSCULOSKELETAL: Left knee pain


SKIN:   Denies rash, lesions or sores.


HEMATOLOGIC :   Denies easy bruising or bleeding.


LYMPHATIC:  Denies swollen, enlarged glands.


NEUROLOGICAL:  Denies confusion or altered mental status.  Denies passing out 

or loss of consciousness.  Denies dizziness or lightheadedness.  Denies 

headache.  Denies weakness or paralysis or loss of use of either side.  Denies 

problems with gait or speech.  Denies sensory loss, numbness, or tingling.  

Denies seizures.


PSYCHIATRIC:  Denies anxiety or stress.  Denies depression, suicidal ideation, 

or homicidal ideation.





ALL OTHER SYSTEMS REVIEWED AND NEGATIVE.





Dictation was performed using Dragon voice recognition software 





PHYSICAL EXAMINATION:





GENERAL: Well-appearing, well-nourished and in no acute distress.





HEAD: Atraumatic, normocephalic.





EYES: Pupils equal round and reactive to light, extraocular movements intact, 

sclera anicteric, conjunctiva are normal.





ENT: Nares patent, oropharynx clear without exudates.  Moist mucous membranes.





NECK: Normal range of motion, supple without lymphadenopathy





LUNGS: Breath sounds clear to auscultation bilaterally and equal.  No wheezes 

rales or rhonchi.





HEART: Regular rate and rhythm without murmurs





ABDOMEN: Soft, nontender, nondistended abdomen.  No guarding, no rebound.  No 

masses appreciated.





Musculoskeletal: Mild edema of the left knee, no erythema no warmth





NEUROLOGICAL: Cranial nerves grossly intact.  Normal speech, normal gait.  

Normal sensory, motor exams 





PSYCH: Normal mood, normal affect.





SKIN: Dry, normal turgor, no rashes or lesions noted.





Physical Exam





- Vital signs


Vitals: 


 











Temp Pulse Resp BP Pulse Ox


 


 98.7 F   70   20   147/70 H  98 


 


 03/04/18 08:10  03/04/18 08:10  03/04/18 08:10  03/04/18 08:10  03/04/18 08:10














Course





- Re-evaluation


Re-evalutation: 





03/04/18 08:55


There is obvious edema of the left knee consistent with gout, there is no 

erythema patient is afebrile he has no signs of septic joint he has no 

abrasions no lacerations there is no drainage, I did speak to the patient 

regarding risks and benefits of the symptoms, he has taken colchicine in the 

past with no difficulty


03/04/18 09:40


Patient has been instructed to stop the allopurinol, given that his symptoms 

are starting to improve with medications I will discharge home steroids and 

very close follow-up.  Patient and I discussed concerns of kidney injury fever





After performing a Medical Screening Examination, I estimate there is LOW risk 

for OPEN FRACTURE, COMPARTMENT SYNDROME, TENDON RUPTURE, ACUTE NEUROVASCULAR 

INJURY, or RETAINED FOREIGN BODY, thus I consider the discharge disposition 

reasonable. Also, there is no evidence or peritonitis, sepsis, or toxicity.  I 

have reevaluated this patient multiple times and no significant life 

threatening changes are noted. The patient and I have discussed the diagnosis 

and risks, and we agree with discharging home with close follow-up with the 

understanding that symptoms and presentations can change. We also discussed 

returning to the Emergency Department immediately if new or worsening symptoms 

occur. We have discussed the symptoms which are most concerning (e.g., changing 

or worsening pain, fever, numbness, weakness, cool or painful digits) that 

necessitate immediate return.





- Vital Signs


Vital signs: 


 











Temp Pulse Resp BP Pulse Ox


 


 98.7 F   70   20   147/70 H  98 


 


 03/04/18 08:10  03/04/18 08:10  03/04/18 08:10  03/04/18 08:10  03/04/18 08:10














Discharge





- Discharge


Clinical Impression: 


Chronic kidney disease (CKD)


Qualifiers:


 Chronic kidney disease stage: stage 4 (severe) Qualified Code(s): N18.4 - 

Chronic kidney disease, stage 4 (severe)





Gout of knee


Qualifiers:


 Gout etiology: unspecified cause Chronicity: acute Laterality: left Qualified 

Code(s): M10.9 - Gout, unspecified





Condition: Stable


Disposition: HOME, SELF-CARE


Instructions:  Gout Diet ([facility]), Gout ([facility])


Prescriptions: 


Hydrocodone/Acetaminophen [Hydrocodon-Acetaminoph 7.5-325] 1 each PO Q6 #20 

tablet


Prednisone [Deltasone 20 mg Tablet] 3 tab PO DAILY 5 Days  tablet


Referrals: 


KIMMY ALVES PA [Primary Care Provider] - Follow up tomorrow

## 2018-04-21 ENCOUNTER — HOSPITAL ENCOUNTER (EMERGENCY)
Dept: HOSPITAL 62 - ER | Age: 78
Discharge: HOME | End: 2018-04-21
Payer: OTHER GOVERNMENT

## 2018-04-21 VITALS — DIASTOLIC BLOOD PRESSURE: 71 MMHG | SYSTOLIC BLOOD PRESSURE: 138 MMHG

## 2018-04-21 DIAGNOSIS — J20.8: Primary | ICD-10-CM

## 2018-04-21 DIAGNOSIS — R50.9: ICD-10-CM

## 2018-04-21 DIAGNOSIS — R61: ICD-10-CM

## 2018-04-21 DIAGNOSIS — I25.10: ICD-10-CM

## 2018-04-21 DIAGNOSIS — R05: ICD-10-CM

## 2018-04-21 DIAGNOSIS — I10: ICD-10-CM

## 2018-04-21 DIAGNOSIS — J44.9: ICD-10-CM

## 2018-04-21 PROCEDURE — 71046 X-RAY EXAM CHEST 2 VIEWS: CPT

## 2018-04-21 PROCEDURE — 99284 EMERGENCY DEPT VISIT MOD MDM: CPT

## 2018-04-21 NOTE — RADIOLOGY REPORT (SQ)
EXAM DESCRIPTION:  CHEST 2 VIEWS



COMPLETED DATE/TIME:  4/21/2018 2:59 pm



REASON FOR STUDY:  coughing, fever



COMPARISON:  5/14/2017



EXAM PARAMETERS:  NUMBER OF VIEWS: two views

TECHNIQUE: Digital Frontal and Lateral radiographic views of the chest acquired.

RADIATION DOSE: NA

LIMITATIONS: none



FINDINGS:  LUNGS AND PLEURA: No opacities, masses or pneumothorax. No pleural effusion.

MEDIASTINUM AND HILAR STRUCTURES: No masses or contour abnormalities.

HEART AND VASCULAR STRUCTURES: Heart stable in size.  No evidence for failure.

BONES: No acute findings.

HARDWARE: Stable.

OTHER: No other significant finding.



IMPRESSION:  NO ACUTE RADIOGRAPHIC FINDING IN THE CHEST.  NO SIGNIFICANT CHANGE FROM PRIOR STUDY.



TECHNICAL DOCUMENTATION:  JOB ID:  [id number]

 2011 [employer]- All Rights Reserved



Reading location - IP/workstation name: [email or web address]

## 2018-04-21 NOTE — ER DOCUMENT REPORT
ED General





- General


Mode of Arrival: Ambulatory


Information source: Patient


TRAVEL OUTSIDE OF THE [place] IN LAST 30 DAYS: No





- General


Chief Complaint: Fever


Stated Complaint: COUGH/FEVER


Time Seen by Provider: 04/21/18 14:38


Notes: 





77 y.o male with a PMHx of HTN and renal failure presents to the ED with a 

continued non productive cough and fever. [facility] and had a temperature of 99.5. He states that thursday 

night at home he started to experience chills and was diaphoretic with an onset 

of the non pruductive cough that has been consistent since. Pt denies any SOB 

or any other complaints at this time. 





Pt denies any Hx of DM, or HLD. Pt reports that he is not on dialysis currently 

but used to be on dialysis. (ROSALEE DAVID)





- Related Data


Allergies/Adverse Reactions: 


 





bacitracin Allergy (Verified 04/21/18 14:38)


 


shellfish derived Allergy (Verified 04/21/18 14:38)


 











Past Medical History





- General


Information source: Patient





- Social History


Smoking Status: Never Smoker


Chew tobacco use (# tins/day): No


Frequency of alcohol use: None


Drug Abuse: None


Family History: Reviewed & Not Pertinent


Patient has suicidal ideation: No


Patient has homicidal ideation: No





- Past Medical History


Cardiac Medical History: Reports: Hx Congestive Heart Failure, Hx Coronary 

Artery Disease, Hx DVT, Hx Hypertension, Hx Pulmonary Embolism


   Denies: Hx Heart Attack


Pulmonary Medical History: Reports: Hx COPD, Hx Pneumonia, Hx Sleep Apnea


   Denies: Hx Asthma, Hx Bronchitis


Neurological Medical History: Denies: Hx Cerebrovascular Accident, Hx Seizures


Renal/ Medical History: Reports: Hx Benign Prostatic Hyperplasia, Hx End 

Stage Renal Disease - myglobinuria, rhabdomyolysis.  Denies: Hx Peritoneal 

Dialysis


GI Medical History: Reports: Hx Diverticulitis, Hx Gastritis, Hx Colonoscopy, 

Hx Endoscopy


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 

Reports Hx Musculoskeletal Deformity, Reports Hx Musculoskeletal Trauma


Past Surgical History: Reports: Hx Bowel Surgery, Hx Cholecystectomy, Hx 

Colostomy - And revision, Hx Pacemaker, Hx Tonsillectomy, Other - Dialysis 

shunt and revision





- Immunizations


Immunizations up to date: Yes


Hx Diphtheria, Pertussis, Tetanus Vaccination: Yes





Review of Systems





- Review of Systems


Constitutional: See HPI, Chills, Diaphoresis, Fever


EENT: No symptoms reported


Cardiovascular: No symptoms reported


Respiratory: See HPI, Cough.  denies: Short of breath


Gastrointestinal: No symptoms reported


Genitourinary: No symptoms reported


Male Genitourinary: No symptoms reported


Musculoskeletal: No symptoms reported


Skin: No symptoms reported


Hematologic/Lymphatic: No symptoms reported


Neurological/Psychological: No symptoms reported


-: Yes All other systems reviewed and negative





Physical Exam





- Vital signs


Vitals: 


 











Temp Pulse Resp BP Pulse Ox


 


 100.0 F   70   20   137/70 H  97 


 


 04/21/18 14:34  04/21/18 14:34  04/21/18 14:34  04/21/18 14:34  04/21/18 14:34














- Notes


Notes: 





PHYSICAL EXAM








GENERAL: Alert, interacts well. No acute distress.





HEAD: Normocephalic, atraumatic.





EYES: Pupils equal, round, and reactive to light. Extraocular movements intact.





ENT: Oral mucosa moist, tongue midline. 





NECK: Full range of motion. Supple. Trachea midline.





LUNGS: Clear to auscultation bilaterally, no wheezes, rales, or rhonchi. No 

respiratory distress.





HEART: Regular rate and rhythm. No murmurs, gallops, or rubs.





ABDOMEN: Soft, non-tender. Non-distended. Bowel sounds present in all 4 

quadrants. No guarding, rebound, or rigidity.





EXTREMITIES: Moves all 4 extremities spontaneously. No edema, radial and 

dorsalis pedis pulses 2/4 bilaterally. No cyanosis.





NEUROLOGICAL: Alert and oriented x3. Normal speech.





PSYCH: Normal affect, normal mood.





SKIN: Warm, dry, normal turgor. No rashes or lesions noted. (ROSALEE DAVID)





Course





- Re-evaluation


Re-evalutation: 





04/21/18 15:19


Chest x-ray reveals no acute process, no increased oxygen requirement from 

baseline, no increased sputum production, borderline febrile but does not quite 

meet criteria.  No wheezing, no shortness of breath, patient is quite well-

appearing.  Likely this is a viral infection causing cough.  Discussed with 

patient that we will use a cough suppressant otherwise he should hydrate well, 

use a humidifier and follow with his primary care physician as an outpatient.  

No indication for antibiotics at this time. (PARK ROJAS)





- Vital Signs


Vital signs: 


 











Temp Pulse Resp BP Pulse Ox


 


 98.3 F   80   18   138/71 H  98 


 


 04/21/18 15:26  04/21/18 15:26  04/21/18 15:26  04/21/18 15:26  04/21/18 15:26














Discharge





- Discharge


Clinical Impression: 


 Acute viral bronchitis





Condition: Stable


Disposition: HOME, SELF-CARE


Additional Instructions: 


Today your chest x-ray did not show any signs of pneumonia.  Your cough and 

borderline fever is likely being caused by a viral infection.  If you develop 

chest pain, shortness of breath, significantly increased sputum production or 

are not improving within the next 5 days please return to the emergency 

department as this may have become bacterial.





Please drink plenty fluids, use a humidifier and consider using the Tessalon 

Perles that I prescribed as a cough suppressant.


Prescriptions: 


Benzonatate [Tessalon Perles 100 mg Capsule] 100 mg PO Q8HP PRN #10 capsule


 PRN Reason: 


Referrals: 


KIMMY ALVES PA [Primary Care Provider] - Follow up in 3-5 days


Scribe Attestation: 





04/21/18 19:47


I personally performed the services described in the documentation, reviewed 

and edited the documentation which was dictated to the scribe in my presence, 

and it accurately records my words and actions. (PARK ROJAS)





Scribe Documentation





- Scribe


Written by Edita:: Edita Stapleton 4/21/18 [id number]


acting as scribe for :: Braeden

## 2018-12-27 ENCOUNTER — HOSPITAL ENCOUNTER (EMERGENCY)
Dept: HOSPITAL 62 - ER | Age: 78
Discharge: HOME | End: 2018-12-27
Payer: OTHER GOVERNMENT

## 2018-12-27 VITALS — DIASTOLIC BLOOD PRESSURE: 72 MMHG | SYSTOLIC BLOOD PRESSURE: 134 MMHG

## 2018-12-27 DIAGNOSIS — R05: ICD-10-CM

## 2018-12-27 DIAGNOSIS — R09.81: ICD-10-CM

## 2018-12-27 DIAGNOSIS — J06.9: ICD-10-CM

## 2018-12-27 DIAGNOSIS — J44.1: Primary | ICD-10-CM

## 2018-12-27 LAB
ADD MANUAL DIFF: NO
ALBUMIN SERPL-MCNC: 4.3 G/DL (ref 3.5–5)
ALP SERPL-CCNC: 81 U/L (ref 38–126)
ALT SERPL-CCNC: 14 U/L (ref 21–72)
ANION GAP SERPL CALC-SCNC: 11 MMOL/L (ref 5–19)
AST SERPL-CCNC: 25 U/L (ref 17–59)
BASOPHILS # BLD AUTO: 0.1 10^3/UL (ref 0–0.2)
BASOPHILS NFR BLD AUTO: 0.9 % (ref 0–2)
BILIRUB DIRECT SERPL-MCNC: 0.3 MG/DL (ref 0–0.4)
BILIRUB SERPL-MCNC: 1.3 MG/DL (ref 0.2–1.3)
BUN SERPL-MCNC: 63 MG/DL (ref 7–20)
CALCIUM: 9.7 MG/DL (ref 8.4–10.2)
CHLORIDE SERPL-SCNC: 101 MMOL/L (ref 98–107)
CO2 SERPL-SCNC: 31 MMOL/L (ref 22–30)
EOSINOPHIL # BLD AUTO: 0.3 10^3/UL (ref 0–0.6)
EOSINOPHIL NFR BLD AUTO: 2.7 % (ref 0–6)
ERYTHROCYTE [DISTWIDTH] IN BLOOD BY AUTOMATED COUNT: 16.2 % (ref 11.5–14)
GLUCOSE SERPL-MCNC: 111 MG/DL (ref 75–110)
HCT VFR BLD CALC: 42.4 % (ref 37.9–51)
HGB BLD-MCNC: 14.5 G/DL (ref 13.5–17)
LYMPHOCYTES # BLD AUTO: 2.2 10^3/UL (ref 0.5–4.7)
LYMPHOCYTES NFR BLD AUTO: 20.7 % (ref 13–45)
MCH RBC QN AUTO: 32.7 PG (ref 27–33.4)
MCHC RBC AUTO-ENTMCNC: 34.2 G/DL (ref 32–36)
MCV RBC AUTO: 96 FL (ref 80–97)
MONOCYTES # BLD AUTO: 1.3 10^3/UL (ref 0.1–1.4)
MONOCYTES NFR BLD AUTO: 12.5 % (ref 3–13)
NEUTROPHILS # BLD AUTO: 6.6 10^3/UL (ref 1.7–8.2)
NEUTS SEG NFR BLD AUTO: 63.2 % (ref 42–78)
NT PRO BNP: 1140 PG/ML (ref ?–450)
PLATELET # BLD: 191 10^3/UL (ref 150–450)
POTASSIUM SERPL-SCNC: 3.7 MMOL/L (ref 3.6–5)
PROT SERPL-MCNC: 7.8 G/DL (ref 6.3–8.2)
RBC # BLD AUTO: 4.43 10^6/UL (ref 4.35–5.55)
SODIUM SERPL-SCNC: 142.5 MMOL/L (ref 137–145)
TOTAL CELLS COUNTED % (AUTO): 100 %
TROPONIN I SERPL-MCNC: 0.02 NG/ML
WBC # BLD AUTO: 10.4 10^3/UL (ref 4–10.5)

## 2018-12-27 PROCEDURE — 93010 ELECTROCARDIOGRAM REPORT: CPT

## 2018-12-27 PROCEDURE — 83880 ASSAY OF NATRIURETIC PEPTIDE: CPT

## 2018-12-27 PROCEDURE — 99285 EMERGENCY DEPT VISIT HI MDM: CPT

## 2018-12-27 PROCEDURE — 94640 AIRWAY INHALATION TREATMENT: CPT

## 2018-12-27 PROCEDURE — 80053 COMPREHEN METABOLIC PANEL: CPT

## 2018-12-27 PROCEDURE — 84484 ASSAY OF TROPONIN QUANT: CPT

## 2018-12-27 PROCEDURE — 85025 COMPLETE CBC W/AUTO DIFF WBC: CPT

## 2018-12-27 PROCEDURE — 71045 X-RAY EXAM CHEST 1 VIEW: CPT

## 2018-12-27 PROCEDURE — 93005 ELECTROCARDIOGRAM TRACING: CPT

## 2018-12-27 PROCEDURE — 36415 COLL VENOUS BLD VENIPUNCTURE: CPT

## 2018-12-27 NOTE — RADIOLOGY REPORT (SQ)
EXAM DESCRIPTION:  CHEST SINGLE VIEW



COMPLETED DATE/TIME:  12/27/2018 7:21 pm



REASON FOR STUDY:  shortness of breath



COMPARISON:  None.



EXAM PARAMETERS:  NUMBER OF VIEWS: One view.

TECHNIQUE: Single frontal radiographic view of the chest acquired.

RADIATION DOSE: NA

LIMITATIONS: None.



FINDINGS:  LUNGS AND PLEURA: No opacities, masses or pneumothorax. No pleural effusion.

MEDIASTINUM AND HILAR STRUCTURES: No masses.  Contour normal.

HEART AND VASCULAR STRUCTURES: Heart normal in size.  Normal vasculature.

BONES: No acute findings.

HARDWARE: Cardiac unchanged.

OTHER: No other significant finding.



IMPRESSION:  NO ACUTE RADIOGRAPHIC FINDING IN THE CHEST.



TECHNICAL DOCUMENTATION:  JOB ID:  [id number]

 2011 [employer]- All Rights Reserved



Reading location - IP/workstation name: [email or web address]

## 2018-12-27 NOTE — ER DOCUMENT REPORT
ED Medical Screen (RME)





- General


Chief Complaint: Cough


Stated Complaint: DIFFICULTY BREATHING


Time Seen by Provider: 12/27/18 19:57


Notes: 


78-year-old male with complaints of cough, congestion, mild shortness of breath 

for the past 5 days.  He does report chest pain but does associate this with the

cough.  Denies fever/chills.  He states he feels like he is developing 

pneumonia.  He states he has COPD, uses pro-air only at home, is not on home 

oxygen.  Also reports history of CHF, on Lasix, states he has a little bit more 

shortness of breath when lying flat than usual.  Lives at home.





TRAVEL OUTSIDE OF THE [place] IN LAST 30 DAYS: No





- Related Data


Allergies/Adverse Reactions: 


                                        





bacitracin Allergy (Verified 04/21/18 14:38)


   


shellfish derived Allergy (Verified 04/21/18 14:38)


   











Past Medical History





- Social History


Chew tobacco use (# tins/day): No


Frequency of alcohol use: None


Drug Abuse: None





- Past Medical History


Cardiac Medical History: Reports: Hx Congestive Heart Failure, Hx Coronary 

Artery Disease, Hx DVT, Hx Hypertension, Hx Pulmonary Embolism


   Denies: Hx Heart Attack


Pulmonary Medical History: Reports: Hx COPD, Hx Pneumonia, Hx Sleep Apnea


   Denies: Hx Asthma, Hx Bronchitis


Neurological Medical History: Denies: Hx Cerebrovascular Accident, Hx Seizures


Renal/ Medical History: Reports: Hx Benign Prostatic Hyperplasia, Hx End Stage

Renal Disease - myglobinuria, rhabdomyolysis.  Denies: Hx Peritoneal Dialysis


GI Medical History: Reports: Hx Diverticulitis, Hx Gastritis, Hx Colonoscopy, Hx

Endoscopy


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 

Reports Hx Musculoskeletal Deformity, Reports Hx Musculoskeletal Trauma


Past Surgical History: Reports: Hx Bowel Surgery, Hx Cholecystectomy, Hx 

Colostomy - And revision, Hx Pacemaker, Hx Tonsillectomy, Other - Dialysis shunt

and revision





- Immunizations


Immunizations up to date: Yes


Hx Diphtheria, Pertussis, Tetanus Vaccination: Yes





Physical Exam





- Vital signs


Vitals: 





                                        











Temp Pulse Resp BP Pulse Ox


 


 99 F   69   16   142/64 H  99 


 


 12/27/18 17:26  12/27/18 17:26  12/27/18 17:26  12/27/18 17:26  12/27/18 17:26














- Respiratory


Breath sounds: Other - Patient with frequent coughing episodes in triage, clear 

lung sounds throughout, no wheezing, rales, rhonchi noted.  No respiratory 

distress.





Course





- Re-evaluation


Re-evalutation: 


I have greeted and performed a rapid initial assessment of this patient.  A 

comprehensive ED assessment and evaluation of the patient, analysis of test 

results and completion of the medical decision making process will be conducted 

by additional ED providers.





- Vital Signs


Vital signs: 





                                        











Temp Pulse Resp BP Pulse Ox


 


 99 F   69   16   142/64 H  99 


 


 12/27/18 17:26  12/27/18 17:26  12/27/18 17:26  12/27/18 17:26  12/27/18 17:26














Doctor's Discharge





- Discharge


Referrals: 


KIMMY ALVES PA [Primary Care Provider] - Follow up as needed

## 2018-12-27 NOTE — ER DOCUMENT REPORT
ED General





- General


Chief Complaint: Cough


Stated Complaint: DIFFICULTY BREATHING


Time Seen by Provider: 12/27/18 19:57


Mode of Arrival: Ambulatory


Information source: Patient, Relative, [facility] Records


Notes: 





78-year-old male who with COPD, congestive heart failure, coronary artery 

disease, hypertension, atrial fibrillation (with pacemaker), end-stage renal 

disease presents with complaint of cough, nasal congestion.  Patient states 

cough started 1 week prior to arrival he describes it as a persistent productive

cough with yellow sputum.  Patient states that earlier in the week he experienc

ed sore throat, shortness of breath with coughing only.  Patient is a former 

smoker and quit 43 years ago.  He does not require oxygen or maintenance 

medications at home.  Patient denies fever, chills, chest pain, back pain, 

abdominal pain, vomiting, diarrhea, dysuria.


TRAVEL OUTSIDE OF THE [place] IN LAST 30 DAYS: No





- HPI


Onset: Last week


Onset/Duration: Gradual, Persistent


Quality of pain: No pain


Associated symptoms: Body/muscle aches, Productive cough, Rhinnorhea, Sinus 

pain/drainage, Shortness of breath.  denies: Chest pain, Chills, Diarrhea, 

Fever, Hurts to breath, Leg swelling, Nausea, Vomiting


Exacerbated by: Denies


Relieved by: Denies


Similar symptoms previously: No


Recently seen / treated by doctor: No





- Related Data


Allergies/Adverse Reactions: 


                                        





bacitracin Allergy (Verified 04/21/18 14:38)


   


shellfish derived Allergy (Verified 04/21/18 14:38)


   











Past Medical History





- General


Information source: Patient





- Social History


Smoking Status: Former Smoker


Chew tobacco use (# tins/day): No


Frequency of alcohol use: None


Drug Abuse: None


Lives with: Spouse/Significant other


Family History: Reviewed & Not Pertinent


Patient has suicidal ideation: No


Patient has homicidal ideation: No





- Past Medical History


Cardiac Medical History: Reports: Hx Congestive Heart Failure, Hx Coronary 

Artery Disease, Hx DVT, Hx Hypertension, Hx Pulmonary Embolism


   Denies: Hx Heart Attack


Pulmonary Medical History: Reports: Hx COPD, Hx Pneumonia, Hx Sleep Apnea


   Denies: Hx Asthma, Hx Bronchitis


Neurological Medical History: Denies: Hx Cerebrovascular Accident, Hx Seizures


Renal/ Medical History: Reports: Hx Benign Prostatic Hyperplasia, Hx End Stage

Renal Disease - myglobinuria, rhabdomyolysis.  Denies: Hx Peritoneal Dialysis


GI Medical History: Reports: Hx Diverticulitis, Hx Gastritis, Hx Colonoscopy, Hx

Endoscopy


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 

Reports Hx Musculoskeletal Deformity, Reports Hx Musculoskeletal Trauma


Past Surgical History: Reports: Hx Bowel Surgery, Hx Cholecystectomy, Hx 

Colostomy - And revision, Hx Pacemaker, Hx Tonsillectomy, Other - Dialysis shunt

and revision





- Immunizations


Immunizations up to date: Yes


Hx Diphtheria, Pertussis, Tetanus Vaccination: Yes





Review of Systems





- Review of Systems


Notes: 





REVIEW OF SYSTEMS:


CONSTITUTIONAL :  Denies fever,  chills, or sweats.  Denies recent illness. 

Denies weight loss, recent hospitalizations. 


EENT: Denies visual changes, eye pain.  Denies , oral lesions, difficulty 

swallowing.


CARDIOVASCULAR:  Denies chest pain.  Denies palpitations. Denies lower extremity

edema.


RESPIRATORY: Denies wheezing. + Cough, mild shortness of breath


GASTROINTESTINAL:  Denies abdominal pain or distention.  Denies nausea, 

vomiting, or diarrhea.  Denies blood in vomitus, stools, or per rectum.  Denies 

black, tarry stools.  Denies constipation.  


GENITOURINARY:  Denies difficulty urinating, painful urination,  frequency, 

blood in urine, testicular pain or penile discharge.


MUSCULOSKELETAL:  Denies back or neck pain or stiffness.  Denies joint pain or 

swelling.


SKIN:   Denies rash, lesions or sores.


HEMATOLOGIC :   Denies easy bruising or bleeding.


LYMPHATIC:  Denies swollen glands.


NEUROLOGICAL:  Denies confusion or altered mental status.  Denies loss of 

consciousness.  Denies dizziness or lightheadedness.  Denies headache.  Denies 

weakness or paralysis.  Denies problems difficulty with ambulation, slurred 

speech.  Denies sensory loss, numbness, or tingling.  Denies seizures.


PSYCHIATRIC:  Denies anxiety or stress.  Denies depression, suicidal ideation, 

or





Physical Exam





- Vital signs


Vitals: 


                                        











Temp Pulse Resp BP Pulse Ox


 


 99 F   69   16   142/64 H  99 


 


 12/27/18 17:26  12/27/18 17:26  12/27/18 17:26  12/27/18 17:26  12/27/18 17:26











Interpretation: No: Hypoxic, Febrile





- Notes


Notes: 





PHYSICAL EXAMINATION:





GENERAL: Well-appearing, well-nourished and in no acute distress.





HEAD: Atraumatic, normocephalic.





EYES: Pupils equal round and reactive to light, extraocular movements intact, 

sclera anicteric, conjunctiva are normal.





ENT: Nares patent, oropharynx clear without exudates.  Moist mucous membranes.





NECK: Normal range of motion, supple without lymphadenopathy





LUNGS: Breath sounds clear to auscultation bilaterally and equal.  No wheezes 

rales or rhonchi.  Persistent harsh cough, no accessory muscle use, no 

respiratory distress.





HEART: Irregular rhythm with regular rate, no murmurs





ABDOMEN: Soft, nontender, nondistended abdomen.  No guarding, no rebound.  No 

masses appreciated.





Musculoskeletal: Normal range of motion, no pitting or edema.  No cyanosis.





NEUROLOGICAL: Cranial nerves grossly intact.  Normal speech, normal gait.  

Normal sensory, motor exams 





PSYCH: Normal mood, normal affect.





SKIN: Warm, Dry, normal turgor, no rashes or lesions noted.





Course





- Re-evaluation


Re-evalutation: 





12/28/18 00:18





Laboratory











  12/27/18 12/27/18 12/27/18





  20:40 20:40 20:40


 


WBC  10.4  


 


RBC  4.43  


 


Hgb  14.5  


 


Hct  42.4  


 


MCV  96  


 


MCH  32.7  


 


MCHC  34.2  


 


RDW  16.2 H  


 


Plt Count  191  


 


Seg Neutrophils %  63.2  


 


Lymphocytes %  20.7  


 


Monocytes %  12.5  


 


Eosinophils %  2.7  


 


Basophils %  0.9  


 


Absolute Neutrophils  6.6  


 


Absolute Lymphocytes  2.2  


 


Absolute Monocytes  1.3  


 


Absolute Eosinophils  0.3  


 


Absolute Basophils  0.1  


 


Sodium   142.5 


 


Potassium   3.7 


 


Chloride   101 


 


Carbon Dioxide   31 H 


 


Anion Gap   11 


 


BUN   63 H 


 


Creatinine   2.71 H 


 


Est GFR ( Amer)   28 L 


 


Est GFR (Non-Af Amer)   23 L 


 


Glucose   111 H 


 


Calcium   9.7 


 


Total Bilirubin   1.3 


 


Direct Bilirubin   0.3 


 


Neonat Total Bilirubin   Not Reportable 


 


Neonat Direct Bilirubin   Not Reportable 


 


Neonat Indirect Bili   Not Reportable 


 


AST   25 


 


ALT   14 L 


 


Alkaline Phosphatase   81 


 


Troponin I    0.023


 


NT-Pro-B Natriuret Pep    1140 H


 


Total Protein   7.8 


 


Albumin   4.3 











                                        





Chest X-Ray  12/27/18 19:01


IMPRESSION:  NO ACUTE RADIOGRAPHIC FINDING IN THE CHEST.


 








Presentation is most consistent with a viral upper respiratory infection.  

Patient is overall well appearance, vitals within normal limits, well-hydrated. 

Patient denies any headache, neck pain, and has no evidence of meningismus on 

examination.  Lungs are clear bilaterally.  No evidence of respiratory distress.

 Based on clinical exam and history, I do not suspect an acute pneumonia, 

meningitis, strep pharyngitis, or an acute encephalitis.  CBC is without 

leukocytosis or anemia.  CMP unremarkable.  Chest x-ray shows no evidence of 

pneumonia.  Patient did receive a DuoNeb and coughing has improved.  Patient has

an albuterol MDI at home but states that he did not want to use it because he 

did not want to cover up the cough.  Will discharge patient with return pre

cautions and followup recommendations.  They are in agreement this plan have 

verbalized understanding return precautions.  Patient was evaluated and treated 

as appropriate for the patient's presenting symptoms and complaint, with 

consideration of any critical or life threatening conditions that may be 

associated with their obtained history and exam as noted above. All results were

discussed with patient .  Prescriptions for prednisone, Tessalon Perles were 

provided.  Patient provided the opportunity to ask questions, and express 

concerns. Patient was educated on treatments based on their presumed diagnosis 

as noted above.  At this time we will discharge the patient with return 

precautions and follow-up recommendations.  Verbal discharge instructions given 

a the bedside. Medication warnings reviewed. Patient is in agreement with this 

plan and has verbalized understanding of return precautions. 





After careful consideration I feel that that patient can be safely discharged 

from the emergency department,  they were advised to followup with a primary 

care physician in 2-3 days. 








Dictation on this chart was performed using voice recognition software and may 

result in unintended grammatical, spelling, syntax or errors.

















- Vital Signs


Vital signs: 


                                        











Temp Pulse Resp BP Pulse Ox


 


 99 F   69   14   134/72 H  98 


 


 12/27/18 17:26  12/27/18 17:26  12/27/18 23:07  12/27/18 23:07  12/27/18 23:07














- Laboratory


Result Diagrams: 


                                 12/27/18 20:40





                                 12/27/18 20:40


Laboratory results interpreted by me: 


                                        











  12/27/18 12/27/18 12/27/18





  20:40 20:40 20:40


 


RDW  16.2 H  


 


Carbon Dioxide   31 H 


 


BUN   63 H 


 


Creatinine   2.71 H 


 


Est GFR ( Amer)   28 L 


 


Est GFR (Non-Af Amer)   23 L 


 


Glucose   111 H 


 


ALT   14 L 


 


NT-Pro-B Natriuret Pep    1140 H














- Diagnostic Test


Radiology reviewed: Image reviewed, Reports reviewed





Discharge





- Discharge


Clinical Impression: 


 COPD exacerbation, Cough, Nasal congestion





URI (upper respiratory infection)


Qualifiers:


 URI type: unspecified URI Qualified Code(s): J06.9 - Acute upper respiratory 

infection, unspecified





Condition: Good


Disposition: HOME, SELF-CARE


Instructions:  Chronic Obstructive Lung Disease ([facility]), Upper Respiratory Illness

([facility]), Viral Syndrome ([facility])


Additional Instructions: 


You were seen for symptoms most consistent with bronchitis. This can take up to 

12 weeks to fully resolve. This is generally due to a viral infection. Please 

follow-up with your primary doctor in the next 2-3 days. Return if you develop w

orsening cough, vomiting, fever >100.4, pass out, begin coughing blood, or have 

any other symptoms that are concerning to you. Please use the medications 

prescribed today as directed.


Prescriptions: 


Benzonatate [Tessalon Perle 100 mg Capsule] 200 mg PO Q8HP PRN #40 cap


 PRN Reason: 


Azithromycin [Zithromax 250 mg Tablet] 250 mg PO ASDIR PRN #6 tablet


 PRN Reason: 


RX: Prednisone [Deltasone 20 mg Tablet] 2 tab PO DAILY 5 Days #10 tablet


Forms:  Elevated Blood Pressure


Referrals: 


KIMMY ALVES PA [Primary Care Provider] - Follow up as needed

## 2018-12-28 NOTE — EKG REPORT
SEVERITY:- ABNORMAL ECG -

AFIB/FLUT AND V-PACED COMPLEXES

:

Confirmed by: Skye Plascencia 28-Dec-2018 14:58:22

## 2019-02-20 ENCOUNTER — HOSPITAL ENCOUNTER (OUTPATIENT)
Dept: HOSPITAL 62 - OD | Age: 79
End: 2019-02-20
Attending: INTERNAL MEDICINE
Payer: OTHER GOVERNMENT

## 2019-02-20 DIAGNOSIS — N18.9: Primary | ICD-10-CM

## 2019-02-20 DIAGNOSIS — D64.9: ICD-10-CM

## 2019-02-20 LAB
ADD MANUAL DIFF: NO
ALBUMIN SERPL-MCNC: 4.2 G/DL (ref 3.5–5)
ALP SERPL-CCNC: 75 U/L (ref 38–126)
ALT SERPL-CCNC: 21 U/L (ref 21–72)
ANION GAP SERPL CALC-SCNC: 11 MMOL/L (ref 5–19)
APPEARANCE UR: CLEAR
APTT PPP: YELLOW S
AST SERPL-CCNC: 24 U/L (ref 17–59)
BASOPHILS # BLD AUTO: 0.1 10^3/UL (ref 0–0.2)
BASOPHILS NFR BLD AUTO: 1 % (ref 0–2)
BILIRUB DIRECT SERPL-MCNC: 0.2 MG/DL (ref 0–0.4)
BILIRUB SERPL-MCNC: 1.2 MG/DL (ref 0.2–1.3)
BILIRUB UR QL STRIP: NEGATIVE
BUN SERPL-MCNC: 43 MG/DL (ref 7–20)
CALCIUM: 9.9 MG/DL (ref 8.4–10.2)
CHLORIDE SERPL-SCNC: 102 MMOL/L (ref 98–107)
CO2 SERPL-SCNC: 29 MMOL/L (ref 22–30)
CREAT UR-MCNC: 89 MG/DL (ref 22–328)
EOSINOPHIL # BLD AUTO: 0.2 10^3/UL (ref 0–0.6)
EOSINOPHIL NFR BLD AUTO: 3.7 % (ref 0–6)
ERYTHROCYTE [DISTWIDTH] IN BLOOD BY AUTOMATED COUNT: 15.3 % (ref 11.5–14)
FERRITIN SERPL-MCNC: 66.9 NG/ML (ref 17.9–464)
GLUCOSE SERPL-MCNC: 101 MG/DL (ref 75–110)
GLUCOSE UR STRIP-MCNC: NEGATIVE MG/DL
HCT VFR BLD CALC: 39 % (ref 37.9–51)
HGB BLD-MCNC: 12.9 G/DL (ref 13.5–17)
IRON(TIBC): 75.7 UG/DL (ref 49–181)
KETONES UR STRIP-MCNC: NEGATIVE MG/DL
LYMPHOCYTES # BLD AUTO: 1.9 10^3/UL (ref 0.5–4.7)
LYMPHOCYTES NFR BLD AUTO: 29.1 % (ref 13–45)
MCH RBC QN AUTO: 32 PG (ref 27–33.4)
MCHC RBC AUTO-ENTMCNC: 33.2 G/DL (ref 32–36)
MCV RBC AUTO: 97 FL (ref 80–97)
MONOCYTES # BLD AUTO: 0.7 10^3/UL (ref 0.1–1.4)
MONOCYTES NFR BLD AUTO: 11.7 % (ref 3–13)
NEUTROPHILS # BLD AUTO: 3.5 10^3/UL (ref 1.7–8.2)
NEUTS SEG NFR BLD AUTO: 54.5 % (ref 42–78)
NITRITE UR QL STRIP: NEGATIVE
PH UR STRIP: 6 [PH] (ref 5–9)
PHOSPHATE SERPL-MCNC: 4.5 MG/DL (ref 2.5–4.5)
PLATELET # BLD: 167 10^3/UL (ref 150–450)
POTASSIUM SERPL-SCNC: 3.9 MMOL/L (ref 3.6–5)
PROT SERPL-MCNC: 7 G/DL (ref 6.3–8.2)
PROT UR STRIP-MCNC: 29.6 MG/DL (ref ?–12)
PROT UR STRIP-MCNC: NEGATIVE MG/DL
RBC # BLD AUTO: 4.04 10^6/UL (ref 4.35–5.55)
SODIUM SERPL-SCNC: 141.9 MMOL/L (ref 137–145)
SP GR UR STRIP: 1.01
TOTAL CELLS COUNTED % (AUTO): 100 %
UR PRO/CREAT RATIO RESULT: 0.3 MG/MG (ref 0–0.2)
UROBILINOGEN UR-MCNC: NEGATIVE MG/DL (ref ?–2)
WBC # BLD AUTO: 6.4 10^3/UL (ref 4–10.5)

## 2019-02-20 PROCEDURE — 84443 ASSAY THYROID STIM HORMONE: CPT

## 2019-02-20 PROCEDURE — 84165 PROTEIN E-PHORESIS SERUM: CPT

## 2019-02-20 PROCEDURE — 84156 ASSAY OF PROTEIN URINE: CPT

## 2019-02-20 PROCEDURE — 83970 ASSAY OF PARATHORMONE: CPT

## 2019-02-20 PROCEDURE — 80053 COMPREHEN METABOLIC PANEL: CPT

## 2019-02-20 PROCEDURE — 83540 ASSAY OF IRON: CPT

## 2019-02-20 PROCEDURE — 82728 ASSAY OF FERRITIN: CPT

## 2019-02-20 PROCEDURE — 81001 URINALYSIS AUTO W/SCOPE: CPT

## 2019-02-20 PROCEDURE — 36415 COLL VENOUS BLD VENIPUNCTURE: CPT

## 2019-02-20 PROCEDURE — 82570 ASSAY OF URINE CREATININE: CPT

## 2019-02-20 PROCEDURE — 85025 COMPLETE CBC W/AUTO DIFF WBC: CPT

## 2019-02-20 PROCEDURE — 83735 ASSAY OF MAGNESIUM: CPT

## 2019-02-20 PROCEDURE — 84100 ASSAY OF PHOSPHORUS: CPT

## 2019-02-20 PROCEDURE — 83550 IRON BINDING TEST: CPT

## 2019-02-22 LAB
ALBUMIN/GLOB SERPL: 1.4 {RATIO} (ref 0.7–1.7)
ALPHA-2-GLOBULIN 2: 0.7 G/DL (ref 0.4–1)
BETA GLOBULINS: 1 G/DL (ref 0.7–1.3)
GAMMA GLOB SERPL ELPH-MCNC: 1.1 G/DL (ref 0.4–1.8)
GLOBULIN TOTAL: 2.9 G/DL (ref 2.2–3.9)
OTHER ANTIBIOTIC SUSC ISLT: 4 G/DL (ref 2.9–4.4)
PROT SERPL-MCNC: 6.9 G/DL (ref 6–8.5)

## 2019-04-25 ENCOUNTER — HOSPITAL ENCOUNTER (OUTPATIENT)
Dept: HOSPITAL 62 - OD | Age: 79
End: 2019-04-25
Attending: INTERNAL MEDICINE
Payer: MEDICARE

## 2019-04-25 DIAGNOSIS — N18.4: ICD-10-CM

## 2019-04-25 DIAGNOSIS — I12.9: Primary | ICD-10-CM

## 2019-04-25 LAB
ANION GAP SERPL CALC-SCNC: 10 MMOL/L (ref 5–19)
BUN SERPL-MCNC: 49 MG/DL (ref 7–20)
CALCIUM: 9.7 MG/DL (ref 8.4–10.2)
CHLORIDE SERPL-SCNC: 103 MMOL/L (ref 98–107)
CO2 SERPL-SCNC: 28 MMOL/L (ref 22–30)
ERYTHROCYTE [DISTWIDTH] IN BLOOD BY AUTOMATED COUNT: 14.8 % (ref 11.5–14)
GLUCOSE SERPL-MCNC: 94 MG/DL (ref 75–110)
HCT VFR BLD CALC: 37 % (ref 37.9–51)
HGB BLD-MCNC: 12.5 G/DL (ref 13.5–17)
MCH RBC QN AUTO: 32.6 PG (ref 27–33.4)
MCHC RBC AUTO-ENTMCNC: 33.8 G/DL (ref 32–36)
MCV RBC AUTO: 97 FL (ref 80–97)
PHOSPHATE SERPL-MCNC: 4.1 MG/DL (ref 2.5–4.5)
PLATELET # BLD: 143 10^3/UL (ref 150–450)
POTASSIUM SERPL-SCNC: 4.6 MMOL/L (ref 3.6–5)
RBC # BLD AUTO: 3.84 10^6/UL (ref 4.35–5.55)
SODIUM SERPL-SCNC: 141.1 MMOL/L (ref 137–145)
WBC # BLD AUTO: 6.4 10^3/UL (ref 4–10.5)

## 2019-04-25 PROCEDURE — 85027 COMPLETE CBC AUTOMATED: CPT

## 2019-04-25 PROCEDURE — 83735 ASSAY OF MAGNESIUM: CPT

## 2019-04-25 PROCEDURE — 84100 ASSAY OF PHOSPHORUS: CPT

## 2019-04-25 PROCEDURE — 83970 ASSAY OF PARATHORMONE: CPT

## 2019-04-25 PROCEDURE — 80048 BASIC METABOLIC PNL TOTAL CA: CPT

## 2019-04-25 PROCEDURE — 36415 COLL VENOUS BLD VENIPUNCTURE: CPT

## 2019-10-07 ENCOUNTER — HOSPITAL ENCOUNTER (OUTPATIENT)
Dept: HOSPITAL 62 - OD | Age: 79
End: 2019-10-07
Attending: INTERNAL MEDICINE
Payer: OTHER GOVERNMENT

## 2019-10-07 DIAGNOSIS — N18.4: ICD-10-CM

## 2019-10-07 DIAGNOSIS — I50.9: ICD-10-CM

## 2019-10-07 DIAGNOSIS — I13.0: Primary | ICD-10-CM

## 2019-10-07 DIAGNOSIS — E11.22: ICD-10-CM

## 2019-10-07 DIAGNOSIS — R80.9: ICD-10-CM

## 2019-10-07 LAB
ANION GAP SERPL CALC-SCNC: 12 MMOL/L (ref 5–19)
APPEARANCE UR: CLEAR
APTT PPP: (no result) S
BILIRUB UR QL STRIP: NEGATIVE
BUN SERPL-MCNC: 86 MG/DL (ref 7–20)
CALCIUM: 8.7 MG/DL (ref 8.4–10.2)
CHLORIDE SERPL-SCNC: 103 MMOL/L (ref 98–107)
CO2 SERPL-SCNC: 23 MMOL/L (ref 22–30)
ERYTHROCYTE [DISTWIDTH] IN BLOOD BY AUTOMATED COUNT: 15.8 % (ref 11.5–14)
GLUCOSE SERPL-MCNC: 145 MG/DL (ref 75–110)
GLUCOSE UR STRIP-MCNC: NEGATIVE MG/DL
HCT VFR BLD CALC: 39.2 % (ref 37.9–51)
HGB BLD-MCNC: 12.9 G/DL (ref 13.5–17)
KETONES UR STRIP-MCNC: NEGATIVE MG/DL
MCH RBC QN AUTO: 31.9 PG (ref 27–33.4)
MCHC RBC AUTO-ENTMCNC: 33 G/DL (ref 32–36)
MCV RBC AUTO: 96 FL (ref 80–97)
NITRITE UR QL STRIP: NEGATIVE
PH UR STRIP: 5 [PH] (ref 5–9)
PHOSPHATE SERPL-MCNC: 3.7 MG/DL (ref 2.5–4.5)
PLATELET # BLD: 148 10^3/UL (ref 150–450)
POTASSIUM SERPL-SCNC: 4.1 MMOL/L (ref 3.6–5)
PROT UR STRIP-MCNC: NEGATIVE MG/DL
RBC # BLD AUTO: 4.06 10^6/UL (ref 4.35–5.55)
SP GR UR STRIP: 1.01
URATE SERPL-MCNC: 5.7 MG/DL (ref 3.5–8.5)
UROBILINOGEN UR-MCNC: NEGATIVE MG/DL (ref ?–2)
WBC # BLD AUTO: 8.3 10^3/UL (ref 4–10.5)

## 2019-10-07 PROCEDURE — 36415 COLL VENOUS BLD VENIPUNCTURE: CPT

## 2019-10-07 PROCEDURE — 81001 URINALYSIS AUTO W/SCOPE: CPT

## 2019-10-07 PROCEDURE — 80048 BASIC METABOLIC PNL TOTAL CA: CPT

## 2019-10-07 PROCEDURE — 84550 ASSAY OF BLOOD/URIC ACID: CPT

## 2019-10-07 PROCEDURE — 84100 ASSAY OF PHOSPHORUS: CPT

## 2019-10-07 PROCEDURE — 85027 COMPLETE CBC AUTOMATED: CPT

## 2019-10-07 PROCEDURE — 83735 ASSAY OF MAGNESIUM: CPT

## 2019-10-07 PROCEDURE — 83970 ASSAY OF PARATHORMONE: CPT

## 2019-11-14 ENCOUNTER — HOSPITAL ENCOUNTER (OUTPATIENT)
Dept: HOSPITAL 62 - OD | Age: 79
End: 2019-11-14
Attending: INTERNAL MEDICINE
Payer: MEDICARE

## 2019-11-14 DIAGNOSIS — N25.0: ICD-10-CM

## 2019-11-14 DIAGNOSIS — R60.9: ICD-10-CM

## 2019-11-14 DIAGNOSIS — I50.9: ICD-10-CM

## 2019-11-14 DIAGNOSIS — I13.0: Primary | ICD-10-CM

## 2019-11-14 DIAGNOSIS — N18.4: ICD-10-CM

## 2019-11-14 LAB
ADD MANUAL DIFF: NO
ANION GAP SERPL CALC-SCNC: 9 MMOL/L (ref 5–19)
BASOPHILS # BLD AUTO: 0 10^3/UL (ref 0–0.2)
BASOPHILS NFR BLD AUTO: 0.5 % (ref 0–2)
BUN SERPL-MCNC: 43 MG/DL (ref 7–20)
CALCIUM: 9.5 MG/DL (ref 8.4–10.2)
CHLORIDE SERPL-SCNC: 100 MMOL/L (ref 98–107)
CO2 SERPL-SCNC: 30 MMOL/L (ref 22–30)
EOSINOPHIL # BLD AUTO: 0.1 10^3/UL (ref 0–0.6)
EOSINOPHIL NFR BLD AUTO: 2 % (ref 0–6)
ERYTHROCYTE [DISTWIDTH] IN BLOOD BY AUTOMATED COUNT: 15.5 % (ref 11.5–14)
GLUCOSE SERPL-MCNC: 86 MG/DL (ref 75–110)
HCT VFR BLD CALC: 40.9 % (ref 37.9–51)
HGB BLD-MCNC: 13.6 G/DL (ref 13.5–17)
LYMPHOCYTES # BLD AUTO: 2 10^3/UL (ref 0.5–4.7)
LYMPHOCYTES NFR BLD AUTO: 30.6 % (ref 13–45)
MCH RBC QN AUTO: 32.1 PG (ref 27–33.4)
MCHC RBC AUTO-ENTMCNC: 33.2 G/DL (ref 32–36)
MCV RBC AUTO: 97 FL (ref 80–97)
MONOCYTES # BLD AUTO: 0.9 10^3/UL (ref 0.1–1.4)
MONOCYTES NFR BLD AUTO: 14 % (ref 3–13)
NEUTROPHILS # BLD AUTO: 3.4 10^3/UL (ref 1.7–8.2)
NEUTS SEG NFR BLD AUTO: 52.9 % (ref 42–78)
PHOSPHATE SERPL-MCNC: 4 MG/DL (ref 2.5–4.5)
PLATELET # BLD: 149 10^3/UL (ref 150–450)
POTASSIUM SERPL-SCNC: 4.8 MMOL/L (ref 3.6–5)
RBC # BLD AUTO: 4.23 10^6/UL (ref 4.35–5.55)
TOTAL CELLS COUNTED % (AUTO): 100 %
WBC # BLD AUTO: 6.4 10^3/UL (ref 4–10.5)

## 2019-11-14 PROCEDURE — 85025 COMPLETE CBC W/AUTO DIFF WBC: CPT

## 2019-11-14 PROCEDURE — 84100 ASSAY OF PHOSPHORUS: CPT

## 2019-11-14 PROCEDURE — 83970 ASSAY OF PARATHORMONE: CPT

## 2019-11-14 PROCEDURE — 80048 BASIC METABOLIC PNL TOTAL CA: CPT

## 2019-11-14 PROCEDURE — 36415 COLL VENOUS BLD VENIPUNCTURE: CPT

## 2020-01-02 ENCOUNTER — HOSPITAL ENCOUNTER (OUTPATIENT)
Dept: HOSPITAL 62 - OD | Age: 80
End: 2020-01-02
Attending: INTERNAL MEDICINE
Payer: MEDICARE

## 2020-01-02 DIAGNOSIS — R60.9: ICD-10-CM

## 2020-01-02 DIAGNOSIS — I50.9: ICD-10-CM

## 2020-01-02 DIAGNOSIS — N25.0: ICD-10-CM

## 2020-01-02 DIAGNOSIS — N18.4: ICD-10-CM

## 2020-01-02 DIAGNOSIS — I13.0: Primary | ICD-10-CM

## 2020-01-02 LAB
ADD MANUAL DIFF: NO
ANION GAP SERPL CALC-SCNC: 10 MMOL/L (ref 5–19)
BASOPHILS # BLD AUTO: 0 10^3/UL (ref 0–0.2)
BASOPHILS NFR BLD AUTO: 0.5 % (ref 0–2)
BUN SERPL-MCNC: 39 MG/DL (ref 7–20)
CALCIUM: 9.4 MG/DL (ref 8.4–10.2)
CHLORIDE SERPL-SCNC: 102 MMOL/L (ref 98–107)
CO2 SERPL-SCNC: 28 MMOL/L (ref 22–30)
EOSINOPHIL # BLD AUTO: 0.3 10^3/UL (ref 0–0.6)
EOSINOPHIL NFR BLD AUTO: 4.9 % (ref 0–6)
ERYTHROCYTE [DISTWIDTH] IN BLOOD BY AUTOMATED COUNT: 15.2 % (ref 11.5–14)
GLUCOSE SERPL-MCNC: 94 MG/DL (ref 75–110)
HCT VFR BLD CALC: 39.6 % (ref 37.9–51)
HGB BLD-MCNC: 13.5 G/DL (ref 13.5–17)
LYMPHOCYTES # BLD AUTO: 1.3 10^3/UL (ref 0.5–4.7)
LYMPHOCYTES NFR BLD AUTO: 23.2 % (ref 13–45)
MCH RBC QN AUTO: 32.5 PG (ref 27–33.4)
MCHC RBC AUTO-ENTMCNC: 34 G/DL (ref 32–36)
MCV RBC AUTO: 95 FL (ref 80–97)
MONOCYTES # BLD AUTO: 0.7 10^3/UL (ref 0.1–1.4)
MONOCYTES NFR BLD AUTO: 12.5 % (ref 3–13)
NEUTROPHILS # BLD AUTO: 3.3 10^3/UL (ref 1.7–8.2)
NEUTS SEG NFR BLD AUTO: 58.9 % (ref 42–78)
PHOSPHATE SERPL-MCNC: 3.8 MG/DL (ref 2.5–4.5)
PLATELET # BLD: 164 10^3/UL (ref 150–450)
POTASSIUM SERPL-SCNC: 5.1 MMOL/L (ref 3.6–5)
RBC # BLD AUTO: 4.15 10^6/UL (ref 4.35–5.55)
TOTAL CELLS COUNTED % (AUTO): 100 %
WBC # BLD AUTO: 5.6 10^3/UL (ref 4–10.5)

## 2020-01-02 PROCEDURE — 80048 BASIC METABOLIC PNL TOTAL CA: CPT

## 2020-01-02 PROCEDURE — 84100 ASSAY OF PHOSPHORUS: CPT

## 2020-01-02 PROCEDURE — 85025 COMPLETE CBC W/AUTO DIFF WBC: CPT

## 2020-01-02 PROCEDURE — 36415 COLL VENOUS BLD VENIPUNCTURE: CPT

## 2020-01-02 PROCEDURE — 83970 ASSAY OF PARATHORMONE: CPT

## 2020-02-27 ENCOUNTER — HOSPITAL ENCOUNTER (OUTPATIENT)
Dept: HOSPITAL 62 - OD | Age: 80
End: 2020-02-27
Attending: INTERNAL MEDICINE
Payer: MEDICARE

## 2020-02-27 DIAGNOSIS — I50.9: ICD-10-CM

## 2020-02-27 DIAGNOSIS — R80.9: ICD-10-CM

## 2020-02-27 DIAGNOSIS — I13.0: Primary | ICD-10-CM

## 2020-02-27 DIAGNOSIS — N18.4: ICD-10-CM

## 2020-02-27 DIAGNOSIS — N25.0: ICD-10-CM

## 2020-02-27 LAB
ADD MANUAL DIFF: NO
ALBUMIN SERPL-MCNC: 3.9 G/DL (ref 3.5–5)
ALP SERPL-CCNC: 74 U/L (ref 38–126)
ANION GAP SERPL CALC-SCNC: 10 MMOL/L (ref 5–19)
AST SERPL-CCNC: 31 U/L (ref 17–59)
BASOPHILS # BLD AUTO: 0.1 10^3/UL (ref 0–0.2)
BASOPHILS NFR BLD AUTO: 0.9 % (ref 0–2)
BILIRUB DIRECT SERPL-MCNC: 0.3 MG/DL (ref 0–0.4)
BILIRUB SERPL-MCNC: 0.9 MG/DL (ref 0.2–1.3)
BUN SERPL-MCNC: 71 MG/DL (ref 7–20)
CALCIUM: 9.4 MG/DL (ref 8.4–10.2)
CHLORIDE SERPL-SCNC: 97 MMOL/L (ref 98–107)
CO2 SERPL-SCNC: 32 MMOL/L (ref 22–30)
EOSINOPHIL # BLD AUTO: 0.3 10^3/UL (ref 0–0.6)
EOSINOPHIL NFR BLD AUTO: 4.3 % (ref 0–6)
ERYTHROCYTE [DISTWIDTH] IN BLOOD BY AUTOMATED COUNT: 15.1 % (ref 11.5–14)
GLUCOSE SERPL-MCNC: 130 MG/DL (ref 75–110)
HCT VFR BLD CALC: 39.1 % (ref 37.9–51)
HGB BLD-MCNC: 13.3 G/DL (ref 13.5–17)
LYMPHOCYTES # BLD AUTO: 1.7 10^3/UL (ref 0.5–4.7)
LYMPHOCYTES NFR BLD AUTO: 25.4 % (ref 13–45)
MCH RBC QN AUTO: 32.5 PG (ref 27–33.4)
MCHC RBC AUTO-ENTMCNC: 34 G/DL (ref 32–36)
MCV RBC AUTO: 96 FL (ref 80–97)
MONOCYTES # BLD AUTO: 0.7 10^3/UL (ref 0.1–1.4)
MONOCYTES NFR BLD AUTO: 10.5 % (ref 3–13)
NEUTROPHILS # BLD AUTO: 3.9 10^3/UL (ref 1.7–8.2)
NEUTS SEG NFR BLD AUTO: 58.9 % (ref 42–78)
PHOSPHATE SERPL-MCNC: 2.9 MG/DL (ref 2.5–4.5)
PLATELET # BLD: 173 10^3/UL (ref 150–450)
POTASSIUM SERPL-SCNC: 4.1 MMOL/L (ref 3.6–5)
PROT SERPL-MCNC: 7.5 G/DL (ref 6.3–8.2)
RBC # BLD AUTO: 4.09 10^6/UL (ref 4.35–5.55)
TOTAL CELLS COUNTED % (AUTO): 100 %
WBC # BLD AUTO: 6.5 10^3/UL (ref 4–10.5)

## 2020-02-27 PROCEDURE — 83970 ASSAY OF PARATHORMONE: CPT

## 2020-02-27 PROCEDURE — 85025 COMPLETE CBC W/AUTO DIFF WBC: CPT

## 2020-02-27 PROCEDURE — 80053 COMPREHEN METABOLIC PANEL: CPT

## 2020-02-27 PROCEDURE — 36415 COLL VENOUS BLD VENIPUNCTURE: CPT

## 2020-02-27 PROCEDURE — 83735 ASSAY OF MAGNESIUM: CPT

## 2020-02-27 PROCEDURE — 84100 ASSAY OF PHOSPHORUS: CPT

## 2020-03-25 ENCOUNTER — HOSPITAL ENCOUNTER (OUTPATIENT)
Dept: HOSPITAL 62 - OD | Age: 80
End: 2020-03-25
Attending: INTERNAL MEDICINE
Payer: MEDICARE

## 2020-03-25 DIAGNOSIS — N18.4: ICD-10-CM

## 2020-03-25 DIAGNOSIS — R80.9: ICD-10-CM

## 2020-03-25 DIAGNOSIS — I13.0: Primary | ICD-10-CM

## 2020-03-25 DIAGNOSIS — I50.9: ICD-10-CM

## 2020-03-25 DIAGNOSIS — N25.0: ICD-10-CM

## 2020-03-25 LAB
ADD MANUAL DIFF: NO
ANION GAP SERPL CALC-SCNC: 7 MMOL/L (ref 5–19)
BASOPHILS # BLD AUTO: 0 10^3/UL (ref 0–0.2)
BASOPHILS NFR BLD AUTO: 0.5 % (ref 0–2)
BUN SERPL-MCNC: 46 MG/DL (ref 7–20)
CALCIUM: 9.8 MG/DL (ref 8.4–10.2)
CHLORIDE SERPL-SCNC: 101 MMOL/L (ref 98–107)
CO2 SERPL-SCNC: 31 MMOL/L (ref 22–30)
EOSINOPHIL # BLD AUTO: 0.1 10^3/UL (ref 0–0.6)
EOSINOPHIL NFR BLD AUTO: 2.6 % (ref 0–6)
ERYTHROCYTE [DISTWIDTH] IN BLOOD BY AUTOMATED COUNT: 15.5 % (ref 11.5–14)
GLUCOSE SERPL-MCNC: 92 MG/DL (ref 75–110)
HCT VFR BLD CALC: 39.5 % (ref 37.9–51)
HGB BLD-MCNC: 13.3 G/DL (ref 13.5–17)
LYMPHOCYTES # BLD AUTO: 1.6 10^3/UL (ref 0.5–4.7)
LYMPHOCYTES NFR BLD AUTO: 29 % (ref 13–45)
MCH RBC QN AUTO: 32.9 PG (ref 27–33.4)
MCHC RBC AUTO-ENTMCNC: 33.8 G/DL (ref 32–36)
MCV RBC AUTO: 97 FL (ref 80–97)
MONOCYTES # BLD AUTO: 0.7 10^3/UL (ref 0.1–1.4)
MONOCYTES NFR BLD AUTO: 12.5 % (ref 3–13)
NEUTROPHILS # BLD AUTO: 3 10^3/UL (ref 1.7–8.2)
NEUTS SEG NFR BLD AUTO: 55.4 % (ref 42–78)
PLATELET # BLD: 142 10^3/UL (ref 150–450)
POTASSIUM SERPL-SCNC: 4.3 MMOL/L (ref 3.6–5)
RBC # BLD AUTO: 4.06 10^6/UL (ref 4.35–5.55)
TOTAL CELLS COUNTED % (AUTO): 100 %
WBC # BLD AUTO: 5.4 10^3/UL (ref 4–10.5)

## 2020-03-25 PROCEDURE — 36415 COLL VENOUS BLD VENIPUNCTURE: CPT

## 2020-03-25 PROCEDURE — 80048 BASIC METABOLIC PNL TOTAL CA: CPT

## 2020-03-25 PROCEDURE — 85025 COMPLETE CBC W/AUTO DIFF WBC: CPT

## 2020-03-25 PROCEDURE — 83735 ASSAY OF MAGNESIUM: CPT

## 2020-05-27 ENCOUNTER — HOSPITAL ENCOUNTER (EMERGENCY)
Dept: HOSPITAL 62 - ER | Age: 80
Discharge: HOME | End: 2020-05-27
Payer: OTHER GOVERNMENT

## 2020-05-27 VITALS — DIASTOLIC BLOOD PRESSURE: 72 MMHG | SYSTOLIC BLOOD PRESSURE: 140 MMHG

## 2020-05-27 DIAGNOSIS — Z88.1: ICD-10-CM

## 2020-05-27 DIAGNOSIS — Z87.891: ICD-10-CM

## 2020-05-27 DIAGNOSIS — I25.10: ICD-10-CM

## 2020-05-27 DIAGNOSIS — I13.0: Primary | ICD-10-CM

## 2020-05-27 DIAGNOSIS — Z95.0: ICD-10-CM

## 2020-05-27 DIAGNOSIS — I50.9: ICD-10-CM

## 2020-05-27 DIAGNOSIS — I48.91: ICD-10-CM

## 2020-05-27 DIAGNOSIS — N18.4: ICD-10-CM

## 2020-05-27 DIAGNOSIS — J44.9: ICD-10-CM

## 2020-05-27 DIAGNOSIS — Z79.899: ICD-10-CM

## 2020-05-27 DIAGNOSIS — Z91.013: ICD-10-CM

## 2020-05-27 LAB
ADD MANUAL DIFF: NO
ALBUMIN SERPL-MCNC: 3.6 G/DL (ref 3.5–5)
ALP SERPL-CCNC: 68 U/L (ref 38–126)
ANION GAP SERPL CALC-SCNC: 8 MMOL/L (ref 5–19)
APPEARANCE UR: CLEAR
APTT PPP: YELLOW S
AST SERPL-CCNC: 22 U/L (ref 17–59)
BASOPHILS # BLD AUTO: 0 10^3/UL (ref 0–0.2)
BASOPHILS NFR BLD AUTO: 0.5 % (ref 0–2)
BILIRUB DIRECT SERPL-MCNC: 0 MG/DL (ref 0–0.4)
BILIRUB SERPL-MCNC: 1 MG/DL (ref 0.2–1.3)
BILIRUB UR QL STRIP: NEGATIVE
BUN SERPL-MCNC: 55 MG/DL (ref 7–20)
CALCIUM: 9 MG/DL (ref 8.4–10.2)
CHLORIDE SERPL-SCNC: 97 MMOL/L (ref 98–107)
CK MB SERPL-MCNC: 2.43 NG/ML (ref ?–4.55)
CO2 SERPL-SCNC: 33 MMOL/L (ref 22–30)
EOSINOPHIL # BLD AUTO: 0.1 10^3/UL (ref 0–0.6)
EOSINOPHIL NFR BLD AUTO: 1.8 % (ref 0–6)
ERYTHROCYTE [DISTWIDTH] IN BLOOD BY AUTOMATED COUNT: 15.3 % (ref 11.5–14)
GLUCOSE SERPL-MCNC: 123 MG/DL (ref 75–110)
GLUCOSE UR STRIP-MCNC: NEGATIVE MG/DL
HCT VFR BLD CALC: 39.7 % (ref 37.9–51)
HGB BLD-MCNC: 13.3 G/DL (ref 13.5–17)
KETONES UR STRIP-MCNC: NEGATIVE MG/DL
LYMPHOCYTES # BLD AUTO: 1.4 10^3/UL (ref 0.5–4.7)
LYMPHOCYTES NFR BLD AUTO: 20.9 % (ref 13–45)
MCH RBC QN AUTO: 32.8 PG (ref 27–33.4)
MCHC RBC AUTO-ENTMCNC: 33.4 G/DL (ref 32–36)
MCV RBC AUTO: 98 FL (ref 80–97)
MONOCYTES # BLD AUTO: 0.7 10^3/UL (ref 0.1–1.4)
MONOCYTES NFR BLD AUTO: 10.1 % (ref 3–13)
NEUTROPHILS # BLD AUTO: 4.4 10^3/UL (ref 1.7–8.2)
NEUTS SEG NFR BLD AUTO: 66.7 % (ref 42–78)
NITRITE UR QL STRIP: NEGATIVE
NT PRO BNP: 1930 PG/ML (ref ?–450)
PH UR STRIP: 7 [PH] (ref 5–9)
PLATELET # BLD: 134 10^3/UL (ref 150–450)
POTASSIUM SERPL-SCNC: 4.2 MMOL/L (ref 3.6–5)
PROT SERPL-MCNC: 6.6 G/DL (ref 6.3–8.2)
PROT UR STRIP-MCNC: 30 MG/DL
RBC # BLD AUTO: 4.05 10^6/UL (ref 4.35–5.55)
SP GR UR STRIP: 1.01
TOTAL CELLS COUNTED % (AUTO): 100 %
TROPONIN I SERPL-MCNC: 0.02 NG/ML
UROBILINOGEN UR-MCNC: NEGATIVE MG/DL (ref ?–2)
WBC # BLD AUTO: 6.5 10^3/UL (ref 4–10.5)

## 2020-05-27 PROCEDURE — 82553 CREATINE MB FRACTION: CPT

## 2020-05-27 PROCEDURE — 82550 ASSAY OF CK (CPK): CPT

## 2020-05-27 PROCEDURE — 36415 COLL VENOUS BLD VENIPUNCTURE: CPT

## 2020-05-27 PROCEDURE — 83880 ASSAY OF NATRIURETIC PEPTIDE: CPT

## 2020-05-27 PROCEDURE — 84484 ASSAY OF TROPONIN QUANT: CPT

## 2020-05-27 PROCEDURE — 81001 URINALYSIS AUTO W/SCOPE: CPT

## 2020-05-27 PROCEDURE — 71045 X-RAY EXAM CHEST 1 VIEW: CPT

## 2020-05-27 PROCEDURE — 71250 CT THORAX DX C-: CPT

## 2020-05-27 PROCEDURE — 85025 COMPLETE CBC W/AUTO DIFF WBC: CPT

## 2020-05-27 PROCEDURE — 99285 EMERGENCY DEPT VISIT HI MDM: CPT

## 2020-05-27 PROCEDURE — 80053 COMPREHEN METABOLIC PANEL: CPT

## 2020-05-27 NOTE — RADIOLOGY REPORT (SQ)
EXAM DESCRIPTION:  CT CHEST WITHOUT



IMAGES COMPLETED DATE/TIME:  5/27/2020 4:56 pm



REASON FOR STUDY:  Abnormal chest x-ray



COMPARISON:  Chest x-ray 5/27/2020



TECHNIQUE:  CT scan performed of the chest without intravenous contrast.  Images reviewed with lung, 
soft tissue and bone windows.  Reconstructed coronal and sagittal MPR images reviewed.  All images st
ored on PACS.

All CT scanners at this facility use dose modulation, iterative reconstruction, and/or weight based d
osing when appropriate to reduce radiation dose to as low as reasonably achievable (ALARA).

[facility]: Dose Right  [facility]: CareDose    [facility]: Dose Right    [facility]: Teradose 4D    [facility]: Smart Technologies



RADIATION DOSE:  CT Rad equipment meets quality standard of care and radiation dose reduction techniq
ues were employed. CTDIvol: 19.7 mGy. DLP: 813 mGy-cm. mGy.



LIMITATIONS:  No technical limitations.



FINDINGS:  LUNGS AND PLEURA: No infiltrate, effusion, or mass.

HILAR AND MEDIASTINAL STRUCTURES: No identified masses or abnormal nodes.  No obvious aneurysm.

HEART AND VASCULAR STRUCTURES: No aneurysm.  No pericardial effusion.

UPPER ABDOMEN: No significant findings.  Limited exam.

THYROID AND OTHER SOFT TISSUES: No masses.  No adenopathy.

BONES: No significant finding.

HARDWARE: Pacemaker.

OTHER: No other significant findings.



IMPRESSION:  NO SIGNIFICANT FINDING ON NON-CONTRASTED CHEST CT.



TECHNICAL DOCUMENTATION:  JOB ID:  [id number]

Quality ID # [id number]: Final reports with documentation of one or more dose reduction techniques (e.g., Au
tomated exposure control, adjustment of the mA and/or kV according to patient size, use of iterative 
reconstruction technique)

 2011 [employer]- All Rights Reserved



Reading location - IP/workstation name: [email or web address]

## 2020-05-27 NOTE — ER DOCUMENT REPORT
ED Respiratory Problem





- General


Chief Complaint: Shortness Of Breath


Stated Complaint: SHORTNESS OF BREATH


Time Seen by Provider: 20 16:20


Primary Care Provider: 


GARCIA ROBERTSON MD [ACTIVE STAFF] - Follow up as needed


Notes: 





79-year-old man presents to the emergency department with a history of being 

treated for pneumonia as an outpatient.  States that he is taken multiple 

courses of antibiotics and his chest x-ray has not improved.  He was sent to the

emergency department from the outpatient clinic for a CT of the chest.  He 

denies a productive cough or fever.  He has some atypical chest discomfort invo

lving the left chest area.  History of atrial fibrillation with a pacemaker, 

CHF, renal insufficiency.  He states that his kidneys are about 40% normal.


TRAVEL OUTSIDE OF THE [place] IN LAST 30 DAYS: No





- Related Data


Allergies/Adverse Reactions: 


                                        





bacitracin Allergy (Verified 20 16:12)


   


shellfish derived Allergy (Verified 20 16:12)


   











Past Medical History





- Social History


Smoking Status: Former Smoker


Chew tobacco use (# tins/day): No


Frequency of alcohol use: None


Drug Abuse: None


Family History: Reviewed & Not Pertinent


Patient has homicidal ideation: No





- Past Medical History


Cardiac Medical History: Reports: Hx Congestive Heart Failure, Hx Coronary 

Artery Disease, Hx DVT, Hx Hypertension, Hx Pulmonary Embolism


   Denies: Hx Heart Attack


Pulmonary Medical History: Reports: Hx COPD, Hx Pneumonia, Hx Sleep Apnea


   Denies: Hx Asthma, Hx Bronchitis


Neurological Medical History: Denies: Hx Cerebrovascular Accident, Hx Seizures


Renal/ Medical History: Reports: Hx Benign Prostatic Hyperplasia, Hx End Stage

Renal Disease - myglobinuria, rhabdomyolysis.  Denies: Hx Peritoneal Dialysis


GI Medical History: Reports: Hx Diverticulitis, Hx Gastritis, Hx Colonoscopy, Hx

Endoscopy


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 

Reports Hx Musculoskeletal Deformity, Reports Hx Musculoskeletal Trauma


Past Surgical History: Reports: Hx Bowel Surgery, Hx Cholecystectomy, Hx 

Colostomy - And revision, Hx Pacemaker, Hx Tonsillectomy, Other - Dialysis shunt

and revision





- Immunizations


Immunizations up to date: Yes


Hx Diphtheria, Pertussis, Tetanus Vaccination: Yes





Review of Systems





- Review of Systems


Notes: 





Constitutional: Negative for fever.


HENT: Negative for sore throat.


Eyes: Negative for visual changes.


Cardiovascular: + chest pain.


Respiratory :+shortness of breath.


Gastrointestinal: Negative for abdominal pain, vomiting or diarrhea.


Genitourinary: Negative for dysuria.


Musculoskeletal: Negative for back pain.


Skin: Negative for rash.


Neurological: Negative for headaches, weakness or numbness.





10 point ROS negative except as marked above and in HPI.





Physical Exam





- Vital signs


Vitals: 


                                        











Temp


 


 98.5 F 


 


 20 16:13














- Notes


Notes: 





PHYSICAL EXAMINATION:


 


Physical Exam:


General: Well-nourished well-developed 80 yo male in no acute distress


HEENT: NC/AT, pupils equal round and reactive to light, MM moist,nares clear, 

oropharynx clear, airway patent


Neck: supple, no adenopathy, no masses.  Good range of motion


Lungs: clear, no wheezing, no rales no rhonchi


CVS: Regular rate and rhythm no murmur gallop or rub


Abdomen: Soft, active, nontender, no masses, no hepatosplenomegaly


Ext: 2+ edema bilateral lower extremities


Neuro: Alert and responsive, moving all 4 extremities on command, cranial nerves

intact, no focal findings


Skin: Intact no open lesions, no rash


PSYCH: Normal mood, normal affect.


 








Course





- Re-evaluation


Re-evalutation: 





20 18:49


I have reviewed this chest x-ray, CT scan and laboratory data.  Patient's CT is 

essentially negative for acute findings x-ray is also negative he has an 

elevated BNP patient was consistent with CHF, atrial fib/flutter, and a 

nondiagnostic elevated troponin I.  I discussed these findings with the patient 

and explained that we will repeat the troponin.  If that number is increasing or

is significantly elevated we will have to transfer you to another hospital.  The

patient states he understands and presently is in no acute distress.


20 20:09


Repeat troponin was performed and there is no interval change in the value.  

Patient is in no respiratory distress and is presently on Lasix twice a day.  He

is being discharged home to continue his outpatient management and to follow-up 

with his physician with regards to the unremarkable CT scan.





- Vital Signs


Vital signs: 


                                        











Temp Pulse Resp BP Pulse Ox


 


 98.1 F   59 L  16   150/68 H  95 


 


 20 17:52  20 17:52  20 17:52  20 17:52  05/27/20 17:52














- Laboratory


Result Diagrams: 


                                 20 16:30





                                 20 16:30


Laboratory results interpreted by me: 


                                        











  20





  16:30 16:30 16:30


 


RBC  4.05 L  


 


Hgb  13.3 L  


 


MCV  98 H  


 


RDW  15.3 H  


 


Plt Count  134 L  


 


Chloride   97 L 


 


Carbon Dioxide   33 H 


 


BUN   55 H 


 


Creatinine   2.58 H 


 


Est GFR ( Amer)   29 L 


 


Est GFR (MDRD) Non-Af   24 L 


 


Glucose   123 H 


 


NT-Pro-B Natriuret Pep    1930 H


 


Urine Protein   


 


Urine Ascorbic Acid   














  20





  18:18


 


RBC 


 


Hgb 


 


MCV 


 


RDW 


 


Plt Count 


 


Chloride 


 


Carbon Dioxide 


 


BUN 


 


Creatinine 


 


Est GFR ( Amer) 


 


Est GFR (MDRD) Non-Af 


 


Glucose 


 


NT-Pro-B Natriuret Pep 


 


Urine Protein  30 H


 


Urine Ascorbic Acid  40 H








I have reviewed laboratory data and used this information for the treatment 

decisions regarding the patient.





- Diagnostic Test


Radiology reviewed: Image reviewed, Reports reviewed - Chest x-ray: No acute 

cardiopulmonary findings, mild cardiomegaly.    CT chest noncontrast: No acute 

intrapulmonary/chest findings.





Discharge





- Discharge


Clinical Impression: 


 Chronic kidney disease (CKD), stage IV (severe)





Chronic congestive heart failure


Qualifiers:


 Heart failure type: unspecified Qualified Code(s): I50.9 - Heart failure, 

unspecified





Condition: Good


Disposition: HOME, SELF-CARE


Instructions:  Congestive Heart Failure ([facility])


Additional Instructions: 


You are seen in the emergency department today and a CT scan of your chest was 

performed which does not reveal pneumonia or other acute findings.  Please 

follow-up with your primary care physician regarding the CT finding.  Also, 

please follow up with the emergency department if your symptoms are worsening or

if you have other concerns.





HOME CARE INSTRUCTIONS & INFORMATION:  Thank you for choosing us for your 

medical needs. We hope you're satisfied with the care you received.  After you l

eave, you must properly care for your problem and, at the same time, observe its

progress.  Any condition can change.  Some illnesses can change rapidly over 

hours or days.  If your condition worsens, return to the Emergency Department or

see your physician promptly.





ABOUT YOUR X-RAYS AND EKG'S:   If you had an EKG or X-rays taken, they have been

read by the Emergency Physician. The X-rays and EKG's will also be read by a 

Radiologist or Cardiologist within 24 hours.  If discrepancies are noted, you 

will be notified by telephone.  Please be certain the ED has a correct telephone

number & address where you can be reached.  Also, realize that some fractures or

abnormalities do not show up on initial X-rays.  If your symptoms continue, see 

your physician.





ABOUT YOUR LABORATORY TEST:   If you had laboratory tests, the results have been

reviewed by the Emergency Physician.  Some test results (for example cultures) 

may not be available for several days.  You will be contacted if any test result

shows you need additional treatment.  Please be certain the ED has a correct 

telephone number and address where you can be reached.





ABOUT YOUR MEDICATIONS:  You will receive instructions on how to take your 

medicine on the prescription label you receive.  Additional information may be 

provided by the Pharmacy.  If you have questions afterwards, call the ED for 

clarification or further instructions.  Some prescribed medications may cause 

drowsiness.  Do not perform tasks such as driving a car or operating machinery 

without consulting your Pharmacist.  If you feel you need a refill of pain 

medication, your condition will need re-evaluation.  Please do not call for a 

refill of any medication.





ABOUT YOUR SIGNATURE:   Signature of this document acknowledges to followin. Understanding that you received emergency treatment and that you may be 

released before al medical problems are known or treated. Please be certain   

the ED has a correct phone number & address where you can be reached.


   2. Acknowledgement that you will arrange for follow-up care as recommended.


   3. Authorization for the Emergency Physician to provide information to your 

follow-up Physician in order to maximize your care.





AT ANY TIME, IF YOUR SYMPTOMS CHANGE SIGNIFICANTLY OR WORSEN OR YOU DEVELOP NEW 

SYMPTOMS, RETURN TO THE EMERGENCY DEPARTMENT IMMEDIATELY FOR RE-EVALUATION.





OUR GOAL IS TO PROVIDE EXCELLENT MEDICAL CARE!





WE HOPE THAT WE HAVE MET YOUR EXPECTATIONS DURING YOUR EMERGENCY DEPARTMENT VISI

T AND THAT YOU FEEL YOU HAVE RECEIVED EXCELLENT CARE!











Referrals: 


GARCIA ROBERTSON MD [ACTIVE STAFF] - Follow up as needed

## 2020-05-27 NOTE — RADIOLOGY REPORT (SQ)
EXAM DESCRIPTION:  CHEST SINGLE VIEW



IMAGES COMPLETED DATE/TIME:  5/27/2020 3:41 pm



REASON FOR STUDY:  shortness of breath



COMPARISON:  12/27/2018



EXAM PARAMETERS:  NUMBER OF VIEWS: One view.

TECHNIQUE: Single frontal radiographic view of the chest acquired.

RADIATION DOSE: NA

LIMITATIONS: None.



FINDINGS:  LUNGS AND PLEURA: No opacities, masses or pneumothorax. No pleural effusion.

MEDIASTINUM AND HILAR STRUCTURES: No masses.  Contour normal.

HEART AND VASCULAR STRUCTURES: Heart normal in size.  Normal vasculature.

BONES: No acute findings.

HARDWARE: Left infraclavicular pacemaker with intact lead wires unchanged.

OTHER: No other significant finding.



IMPRESSION:  NO ACUTE RADIOGRAPHIC FINDING IN THE CHEST.



TECHNICAL DOCUMENTATION:  JOB ID:  [id number]

 2011 [employer]- All Rights Reserved



Reading location - IP/workstation name: [email or web address]

## 2020-09-02 ENCOUNTER — HOSPITAL ENCOUNTER (OUTPATIENT)
Dept: HOSPITAL 62 - OD | Age: 80
End: 2020-09-02
Attending: INTERNAL MEDICINE
Payer: OTHER GOVERNMENT

## 2020-09-02 DIAGNOSIS — I13.0: Primary | ICD-10-CM

## 2020-09-02 DIAGNOSIS — E66.9: ICD-10-CM

## 2020-09-02 DIAGNOSIS — N18.4: ICD-10-CM

## 2020-09-02 DIAGNOSIS — I50.9: ICD-10-CM

## 2020-09-02 DIAGNOSIS — N25.0: ICD-10-CM

## 2020-09-02 LAB
ADD MANUAL DIFF: NO
ANION GAP SERPL CALC-SCNC: 12 MMOL/L (ref 5–19)
APPEARANCE UR: CLEAR
APTT PPP: YELLOW S
BASOPHILS # BLD AUTO: 0 10^3/UL (ref 0–0.2)
BASOPHILS NFR BLD AUTO: 0.6 % (ref 0–2)
BILIRUB UR QL STRIP: NEGATIVE
BUN SERPL-MCNC: 50 MG/DL (ref 7–20)
CALCIUM: 9.7 MG/DL (ref 8.4–10.2)
CHLORIDE SERPL-SCNC: 97 MMOL/L (ref 98–107)
CO2 SERPL-SCNC: 32 MMOL/L (ref 22–30)
CREAT UR-MCNC: 60.3 MG/DL (ref 22–328)
EOSINOPHIL # BLD AUTO: 0.1 10^3/UL (ref 0–0.6)
EOSINOPHIL NFR BLD AUTO: 1.8 % (ref 0–6)
ERYTHROCYTE [DISTWIDTH] IN BLOOD BY AUTOMATED COUNT: 14.7 % (ref 11.5–14)
GLUCOSE SERPL-MCNC: 102 MG/DL (ref 75–110)
GLUCOSE UR STRIP-MCNC: NEGATIVE MG/DL
HCT VFR BLD CALC: 42.7 % (ref 37.9–51)
HGB BLD-MCNC: 14.5 G/DL (ref 13.5–17)
KETONES UR STRIP-MCNC: NEGATIVE MG/DL
LYMPHOCYTES # BLD AUTO: 2.1 10^3/UL (ref 0.5–4.7)
LYMPHOCYTES NFR BLD AUTO: 26 % (ref 13–45)
MCH RBC QN AUTO: 32.9 PG (ref 27–33.4)
MCHC RBC AUTO-ENTMCNC: 33.9 G/DL (ref 32–36)
MCV RBC AUTO: 97 FL (ref 80–97)
MONOCYTES # BLD AUTO: 0.8 10^3/UL (ref 0.1–1.4)
MONOCYTES NFR BLD AUTO: 10.3 % (ref 3–13)
NEUTROPHILS # BLD AUTO: 5 10^3/UL (ref 1.7–8.2)
NEUTS SEG NFR BLD AUTO: 61.3 % (ref 42–78)
NITRITE UR QL STRIP: NEGATIVE
PH UR STRIP: 7 [PH] (ref 5–9)
PHOSPHATE SERPL-MCNC: 4.2 MG/DL (ref 2.5–4.5)
PLATELET # BLD: 158 10^3/UL (ref 150–450)
POTASSIUM SERPL-SCNC: 4.7 MMOL/L (ref 3.6–5)
PROT UR STRIP-MCNC: 18.3 MG/DL (ref ?–12)
PROT UR STRIP-MCNC: NEGATIVE MG/DL
RBC # BLD AUTO: 4.4 10^6/UL (ref 4.35–5.55)
SP GR UR STRIP: 1.01
TOTAL CELLS COUNTED % (AUTO): 100 %
UR PRO/CREAT RATIO RESULT: 0.3 MG/MG (ref 0–0.2)
URATE SERPL-MCNC: 10 MG/DL (ref 3.5–8.5)
UROBILINOGEN UR-MCNC: NEGATIVE MG/DL (ref ?–2)
WBC # BLD AUTO: 8.2 10^3/UL (ref 4–10.5)

## 2020-09-02 PROCEDURE — 36415 COLL VENOUS BLD VENIPUNCTURE: CPT

## 2020-09-02 PROCEDURE — 85025 COMPLETE CBC W/AUTO DIFF WBC: CPT

## 2020-09-02 PROCEDURE — 84100 ASSAY OF PHOSPHORUS: CPT

## 2020-09-02 PROCEDURE — 84156 ASSAY OF PROTEIN URINE: CPT

## 2020-09-02 PROCEDURE — 82570 ASSAY OF URINE CREATININE: CPT

## 2020-09-02 PROCEDURE — 84550 ASSAY OF BLOOD/URIC ACID: CPT

## 2020-09-02 PROCEDURE — 83970 ASSAY OF PARATHORMONE: CPT

## 2020-09-02 PROCEDURE — 81001 URINALYSIS AUTO W/SCOPE: CPT

## 2020-09-02 PROCEDURE — 80048 BASIC METABOLIC PNL TOTAL CA: CPT
